# Patient Record
Sex: MALE | Race: WHITE | NOT HISPANIC OR LATINO | URBAN - METROPOLITAN AREA
[De-identification: names, ages, dates, MRNs, and addresses within clinical notes are randomized per-mention and may not be internally consistent; named-entity substitution may affect disease eponyms.]

---

## 2023-03-29 LAB
ALANINE AMINOTRANSFERASE (SGPT) (U/L) IN SER/PLAS: 15 U/L (ref 10–52)
ALBUMIN (G/DL) IN SER/PLAS: 4.4 G/DL (ref 3.4–5)
ALKALINE PHOSPHATASE (U/L) IN SER/PLAS: 44 U/L (ref 33–136)
AMORPHOUS CRYSTALS, URINE: ABNORMAL /HPF
ANION GAP IN SER/PLAS: 9 MMOL/L (ref 10–20)
APPEARANCE, URINE: ABNORMAL
ASPARTATE AMINOTRANSFERASE (SGOT) (U/L) IN SER/PLAS: 18 U/L (ref 9–39)
BASOPHILS (10*3/UL) IN BLOOD BY AUTOMATED COUNT: 0.03 X10E9/L (ref 0–0.1)
BASOPHILS/100 LEUKOCYTES IN BLOOD BY AUTOMATED COUNT: 0.4 % (ref 0–2)
BILIRUBIN TOTAL (MG/DL) IN SER/PLAS: 1 MG/DL (ref 0–1.2)
BILIRUBIN, URINE: NEGATIVE
BLOOD, URINE: ABNORMAL
CALCIUM (MG/DL) IN SER/PLAS: 9.3 MG/DL (ref 8.6–10.3)
CARBON DIOXIDE, TOTAL (MMOL/L) IN SER/PLAS: 31 MMOL/L (ref 21–32)
CHLORIDE (MMOL/L) IN SER/PLAS: 105 MMOL/L (ref 98–107)
CHOLESTEROL (MG/DL) IN SER/PLAS: 185 MG/DL (ref 0–199)
CHOLESTEROL IN HDL (MG/DL) IN SER/PLAS: 52.8 MG/DL
CHOLESTEROL/HDL RATIO: 3.5
COLOR, URINE: YELLOW
CREATININE (MG/DL) IN SER/PLAS: 0.99 MG/DL (ref 0.5–1.3)
EOSINOPHILS (10*3/UL) IN BLOOD BY AUTOMATED COUNT: 0.08 X10E9/L (ref 0–0.4)
EOSINOPHILS/100 LEUKOCYTES IN BLOOD BY AUTOMATED COUNT: 1.2 % (ref 0–6)
ERYTHROCYTE DISTRIBUTION WIDTH (RATIO) BY AUTOMATED COUNT: 13 % (ref 11.5–14.5)
ERYTHROCYTE MEAN CORPUSCULAR HEMOGLOBIN CONCENTRATION (G/DL) BY AUTOMATED: 33.1 G/DL (ref 32–36)
ERYTHROCYTE MEAN CORPUSCULAR VOLUME (FL) BY AUTOMATED COUNT: 99 FL (ref 80–100)
ERYTHROCYTES (10*6/UL) IN BLOOD BY AUTOMATED COUNT: 4.88 X10E12/L (ref 4.5–5.9)
GFR MALE: 76 ML/MIN/1.73M2
GLUCOSE (MG/DL) IN SER/PLAS: 127 MG/DL (ref 74–99)
GLUCOSE, URINE: NEGATIVE MG/DL
HEMATOCRIT (%) IN BLOOD BY AUTOMATED COUNT: 48.3 % (ref 41–52)
HEMOGLOBIN (G/DL) IN BLOOD: 16 G/DL (ref 13.5–17.5)
IMMATURE GRANULOCYTES/100 LEUKOCYTES IN BLOOD BY AUTOMATED COUNT: 0.3 % (ref 0–0.9)
KETONES, URINE: NEGATIVE MG/DL
LDL: 109 MG/DL (ref 0–99)
LEUKOCYTE ESTERASE, URINE: NEGATIVE
LEUKOCYTES (10*3/UL) IN BLOOD BY AUTOMATED COUNT: 6.7 X10E9/L (ref 4.4–11.3)
LYMPHOCYTES (10*3/UL) IN BLOOD BY AUTOMATED COUNT: 0.65 X10E9/L (ref 0.8–3)
LYMPHOCYTES/100 LEUKOCYTES IN BLOOD BY AUTOMATED COUNT: 9.7 % (ref 13–44)
MONOCYTES (10*3/UL) IN BLOOD BY AUTOMATED COUNT: 0.5 X10E9/L (ref 0.05–0.8)
MONOCYTES/100 LEUKOCYTES IN BLOOD BY AUTOMATED COUNT: 7.5 % (ref 2–10)
MUCUS, URINE: ABNORMAL /LPF
NEUTROPHILS (10*3/UL) IN BLOOD BY AUTOMATED COUNT: 5.4 X10E9/L (ref 1.6–5.5)
NEUTROPHILS/100 LEUKOCYTES IN BLOOD BY AUTOMATED COUNT: 80.9 % (ref 40–80)
NITRITE, URINE: NEGATIVE
PH, URINE: 5 (ref 5–8)
PLATELETS (10*3/UL) IN BLOOD AUTOMATED COUNT: 230 X10E9/L (ref 150–450)
POTASSIUM (MMOL/L) IN SER/PLAS: 4 MMOL/L (ref 3.5–5.3)
PROSTATE SPECIFIC AG (NG/ML) IN SER/PLAS: 0.88 NG/ML (ref 0–4)
PROTEIN TOTAL: 6.7 G/DL (ref 6.4–8.2)
PROTEIN, URINE: ABNORMAL MG/DL
RBC, URINE: ABNORMAL /HPF (ref 0–5)
SODIUM (MMOL/L) IN SER/PLAS: 141 MMOL/L (ref 136–145)
SPECIFIC GRAVITY, URINE: 1.03 (ref 1–1.03)
THYROTROPIN (MIU/L) IN SER/PLAS BY DETECTION LIMIT <= 0.05 MIU/L: 2.83 MIU/L (ref 0.44–3.98)
TRIGLYCERIDE (MG/DL) IN SER/PLAS: 118 MG/DL (ref 0–149)
UREA NITROGEN (MG/DL) IN SER/PLAS: 23 MG/DL (ref 6–23)
URIC ACID (URATE) CRYSTALS, URINE: ABNORMAL /HPF
UROBILINOGEN, URINE: <2 MG/DL (ref 0–1.9)
VLDL: 24 MG/DL (ref 0–40)
WBC, URINE: ABNORMAL /HPF (ref 0–5)

## 2023-03-30 LAB
ESTIMATED AVERAGE GLUCOSE FOR HBA1C: 140 MG/DL
HEMOGLOBIN A1C/HEMOGLOBIN TOTAL IN BLOOD: 6.5 %

## 2023-04-04 LAB
TESTOSTERONE FREE (CHAN): 39.3 PG/ML (ref 30–135)
TESTOSTERONE,TOTAL,LC-MS/MS: 367 NG/DL (ref 250–1100)

## 2023-08-15 LAB
APPEARANCE, URINE: CLEAR
BILIRUBIN, URINE: NEGATIVE
BLOOD, URINE: NEGATIVE
COLOR, URINE: YELLOW
GLUCOSE, URINE: NEGATIVE MG/DL
KETONES, URINE: ABNORMAL MG/DL
LEUKOCYTE ESTERASE, URINE: NEGATIVE
NITRITE, URINE: NEGATIVE
PH, URINE: 6 (ref 5–8)
PROTEIN, URINE: NEGATIVE MG/DL
SPECIFIC GRAVITY, URINE: 1.02 (ref 1–1.03)
UROBILINOGEN, URINE: <2 MG/DL (ref 0–1.9)

## 2023-08-16 LAB — URINE CULTURE: NO GROWTH

## 2023-10-02 DIAGNOSIS — E11.43 TYPE 2 DIABETES MELLITUS WITH DIABETIC AUTONOMIC NEUROPATHY, WITHOUT LONG-TERM CURRENT USE OF INSULIN (MULTI): Primary | ICD-10-CM

## 2023-10-03 RX ORDER — LANCETS 33 GAUGE
EACH MISCELLANEOUS
Qty: 100 EACH | Refills: 1 | Status: SHIPPED | OUTPATIENT
Start: 2023-10-03 | End: 2024-05-09

## 2023-10-25 ENCOUNTER — DOCUMENTATION (OUTPATIENT)
Dept: PHYSICAL THERAPY | Facility: CLINIC | Age: 83
End: 2023-10-25
Payer: COMMERCIAL

## 2023-10-25 DIAGNOSIS — R54 SENILE ASTHENIA: Primary | ICD-10-CM

## 2023-10-25 NOTE — PROGRESS NOTES
Physical Therapy    Discharge Summary    Name: Blaise Boss     Encounter Diagnosis   Name Primary?    Senile asthenia Yes      MRN: [id number]  : 1940  Date: 10/25/23    Discharge Summary: PT    Discharge Information: Date of discharge , Date of last visit 6-10-23, Number of attended visits 2, and Referred by DR XIAO    Rehab Discharge Reason:  HELLO DR IEMMA   WE SAW YOUR PATIENT FOR 2 VISITS---HE WAS GOING TO CONTINUE HIS HOME INSTRUCTION AND PROGRAM--PLAN DISCHARGE TO HOME EXERCISES-- Failed to schedule and/or keep follow-up appointment(s)

## 2023-12-20 ENCOUNTER — OFFICE VISIT (OUTPATIENT)
Dept: PRIMARY CARE | Facility: CLINIC | Age: 83
End: 2023-12-20
Payer: COMMERCIAL

## 2023-12-20 ENCOUNTER — DOCUMENTATION (OUTPATIENT)
Dept: PHYSICAL THERAPY | Facility: CLINIC | Age: 83
End: 2023-12-20

## 2023-12-20 ENCOUNTER — APPOINTMENT (OUTPATIENT)
Dept: PHYSICAL THERAPY | Facility: CLINIC | Age: 83
End: 2023-12-20
Payer: COMMERCIAL

## 2023-12-20 VITALS
TEMPERATURE: 96.3 F | DIASTOLIC BLOOD PRESSURE: 70 MMHG | HEART RATE: 50 BPM | WEIGHT: 175 LBS | SYSTOLIC BLOOD PRESSURE: 120 MMHG | OXYGEN SATURATION: 95 % | RESPIRATION RATE: 16 BRPM

## 2023-12-20 DIAGNOSIS — G70.00 MG (MYASTHENIA GRAVIS) (MULTI): ICD-10-CM

## 2023-12-20 DIAGNOSIS — R54 SENILE ASTHENIA: Primary | ICD-10-CM

## 2023-12-20 DIAGNOSIS — E11.9 TYPE 2 DIABETES MELLITUS WITHOUT COMPLICATION, WITHOUT LONG-TERM CURRENT USE OF INSULIN (MULTI): Primary | ICD-10-CM

## 2023-12-20 DIAGNOSIS — K22.89 ESOPHAGEAL DILATATION: ICD-10-CM

## 2023-12-20 LAB
POC FINGERSTICK BLOOD GLUCOSE: 119 MG/DL (ref 70–100)
POC HEMOGLOBIN A1C: 6 % (ref 4.2–6.5)

## 2023-12-20 PROCEDURE — 1160F RVW MEDS BY RX/DR IN RCRD: CPT | Performed by: NURSE PRACTITIONER

## 2023-12-20 PROCEDURE — 99213 OFFICE O/P EST LOW 20 MIN: CPT | Performed by: NURSE PRACTITIONER

## 2023-12-20 PROCEDURE — 83036 HEMOGLOBIN GLYCOSYLATED A1C: CPT | Performed by: NURSE PRACTITIONER

## 2023-12-20 PROCEDURE — 82962 GLUCOSE BLOOD TEST: CPT | Performed by: NURSE PRACTITIONER

## 2023-12-20 PROCEDURE — 1036F TOBACCO NON-USER: CPT | Performed by: NURSE PRACTITIONER

## 2023-12-20 PROCEDURE — 1159F MED LIST DOCD IN RCRD: CPT | Performed by: NURSE PRACTITIONER

## 2023-12-20 PROCEDURE — 3078F DIAST BP <80 MM HG: CPT | Performed by: NURSE PRACTITIONER

## 2023-12-20 PROCEDURE — 3074F SYST BP LT 130 MM HG: CPT | Performed by: NURSE PRACTITIONER

## 2023-12-20 RX ORDER — NAPROXEN SODIUM 220 MG/1
TABLET, FILM COATED ORAL
COMMUNITY

## 2023-12-20 RX ORDER — GLIMEPIRIDE 1 MG/1
1 TABLET ORAL
COMMUNITY
Start: 2021-01-07

## 2023-12-20 RX ORDER — BRIMONIDINE TARTRATE 2 MG/ML
SOLUTION/ DROPS OPHTHALMIC
COMMUNITY

## 2023-12-20 RX ORDER — LISINOPRIL AND HYDROCHLOROTHIAZIDE 10; 12.5 MG/1; MG/1
1 TABLET ORAL DAILY
COMMUNITY
Start: 2022-04-27 | End: 2024-05-06

## 2023-12-20 RX ORDER — PYRIDOSTIGMINE BROMIDE 180 MG/1
1 TABLET, EXTENDED RELEASE ORAL DAILY
COMMUNITY
Start: 2011-05-18

## 2023-12-20 RX ORDER — OMEPRAZOLE 40 MG/1
40 CAPSULE, DELAYED RELEASE ORAL
COMMUNITY
Start: 2023-12-13

## 2023-12-20 RX ORDER — BLOOD SUGAR DIAGNOSTIC
STRIP MISCELLANEOUS
COMMUNITY
Start: 2020-09-15

## 2023-12-20 RX ORDER — MYCOPHENOLATE MOFETIL 500 MG/1
TABLET ORAL
COMMUNITY
Start: 2023-10-17

## 2023-12-20 RX ORDER — RAVULIZUMAB 300 MG/3ML
SOLUTION, CONCENTRATE INTRAVENOUS
COMMUNITY

## 2023-12-20 RX ORDER — PYRIDOSTIGMINE BROMIDE 60 MG/1
TABLET ORAL
COMMUNITY

## 2023-12-20 ASSESSMENT — ENCOUNTER SYMPTOMS
RESPIRATORY NEGATIVE: 1
PSYCHIATRIC NEGATIVE: 1
CARDIOVASCULAR NEGATIVE: 1
GASTROINTESTINAL NEGATIVE: 1
ENDOCRINE NEGATIVE: 1
CONSTITUTIONAL NEGATIVE: 1

## 2023-12-20 NOTE — PROGRESS NOTES
Physical Therapy                 Therapy Communication Note    Patient Name: Blaise YOUNG Gera     Encounter Diagnosis   Name Primary?    Senile asthenia Yes      MRN: [id number]  Today's Date: 12/20/2023       PATIENT WANTED TO REVIEW HIS CURRENT HOME PROGRAM AND REVIEW  SOMETHING CALLED FATIGUE  CONDITIONING---WE DID THIS WITH PATIENT

## 2023-12-20 NOTE — PROGRESS NOTES
Blaise Boss is a 83 y.o. here for a diabetic follow up exam.     Pt states they are doing well. Following a low carbohydrate diet and is active.     Up to date with eye and foot exams, pcp visits.     Aic 6  Glimepiride 1mg PRN bs 200 has not had to use does not plan to  Over 200 take glimepiride to cover - only use if has to use pred if dr petit needs to put him on pred.  Taking mycophenolate 2 - 500mg daily - to keep immune sytem from acting   Last pred use August    Bs now 119   In PT for [id number] for his MG and 'after back surgery  He is building strength   He is utd with dr le and eye care  He struggles with the right eye and wakness, etc  Dr petit neurologist works with it   Dr [facility] are  - march 5th in [place] - dr barber retired/dr cintron and dr elvis [facility]   [facility]  Fbs -108   -  lowest 85  2hrs pp breakfast -     Vitals:    12/20/23 1357   Weight: 79.4 kg (175 lb)        Visit Vitals  /70   Pulse 50   Temp 35.7 °C (96.3 °F)   Resp 16        Lab Results   Component Value Date    POCGLU 119 (A) 12/20/2023    HGBA1C 6.0 12/20/2023    HGBA1C 6.5 (A) 03/29/2023   (  [unfilled]   Visit Vitals  /70   Pulse 50   Temp 35.7 °C (96.3 °F)   Resp 16        Current Outpatient Medications on File Prior to Visit   Medication Sig Dispense Refill    aspirin 81 mg chewable tablet Chew.      brimonidine (AlphaGAN) 0.2 % ophthalmic solution INSTILL 1 DROP INTO RIGHT EYE THREE TIMES DAILY      glimepiride (Amaryl) 1 mg tablet Take 1 tablet (1 mg) by mouth.      lancets (OneTouch Delica Plus Lancet) 33 gauge misc USE TO CHECK BLOOD GLUCOSE ONCE DAILY 100 each 1    lisinopriL-hydrochlorothiazide 10-12.5 mg tablet Take 1 tablet by mouth once daily.      mycophenolate (Cellcept) 500 mg tablet Twice daily      omeprazole (PriLOSEC) 40 mg DR capsule Take 1 capsule (40 mg) by mouth once daily.      OneTouch Ultra Test strip USE 1 STRIP TO CHECK GLUCOSE ONCE DAILY      pyridostigmine  (Mestinon) 180 mg ER tablet Take 1 tablet (180 mg) by mouth once daily.      pyridostigmine (Mestinon) 60 mg tablet TAKE 1 TABLET (60 MG) BY MOUTH IN THE MORNING, 1 TABLET (60 MG) IN THE EVENING, AND 1 TABLET (60 MG) BEFORE BEDTIME      ravulizumab (Ultomiris) 100 mg/mL injection Infuse into a venous catheter.       No current facility-administered medications on file prior to visit.      Review of Systems   Constitutional: Negative.    Respiratory: Negative.     Cardiovascular: Negative.    Gastrointestinal: Negative.    Endocrine: Negative.    Skin: Negative.    Psychiatric/Behavioral: Negative.            Physical Exam  Vitals and nursing note reviewed.   Constitutional:       Appearance: Normal appearance.   HENT:      Head: Normocephalic.   Eyes:      Pupils: Pupils are equal, round, and reactive to light.   Cardiovascular:      Rate and Rhythm: Normal rate and regular rhythm.      Heart sounds: Normal heart sounds.   Pulmonary:      Effort: Pulmonary effort is normal.      Breath sounds: Normal breath sounds.   Skin:     General: Skin is warm and dry.   Neurological:      General: No focal deficit present.      Mental Status: He is alert and oriented to person, place, and time. Mental status is at baseline.   Psychiatric:         Mood and Affect: Mood normal.         Behavior: Behavior normal.         Thought Content: Thought content normal.         Judgment: Judgment normal.        Problem List Items Addressed This Visit    None  Visit Diagnoses         Codes    Type 2 diabetes mellitus without complication, without long-term current use of insulin (CMS/[facility])    -  Primary E11.9    Relevant Orders    POCT Fingerstick Glucose manually resulted (Completed)    POCT glycosylated hemoglobin (Hb A1C) manually resulted (Completed)    MG (myasthenia gravis) (CMS/[facility])     G70.00    Esophageal dilatation     K22.89

## 2024-03-20 ENCOUNTER — APPOINTMENT (OUTPATIENT)
Dept: PRIMARY CARE | Facility: CLINIC | Age: 84
End: 2024-03-20
Payer: COMMERCIAL

## 2024-03-26 ENCOUNTER — TELEPHONE (OUTPATIENT)
Dept: PRIMARY CARE | Facility: CLINIC | Age: 84
End: 2024-03-26
Payer: COMMERCIAL

## 2024-03-26 NOTE — TELEPHONE ENCOUNTER
SEAMUS from pt. Adm [facility] 3/17 for Covid and Myasthenia Gravis. Poss discharge tomorrow. Called to express his thanks to you and Laura Seaver for exceptional care and will call to follow up.

## 2024-03-27 ENCOUNTER — APPOINTMENT (OUTPATIENT)
Dept: PRIMARY CARE | Facility: CLINIC | Age: 84
End: 2024-03-27
Payer: COMMERCIAL

## 2024-04-02 ENCOUNTER — APPOINTMENT (OUTPATIENT)
Dept: PHYSICAL THERAPY | Facility: CLINIC | Age: 84
End: 2024-04-02
Payer: COMMERCIAL

## 2024-04-02 ENCOUNTER — TELEPHONE (OUTPATIENT)
Dept: PRIMARY CARE | Facility: CLINIC | Age: 84
End: 2024-04-02

## 2024-04-02 ENCOUNTER — APPOINTMENT (OUTPATIENT)
Dept: PRIMARY CARE | Facility: CLINIC | Age: 84
End: 2024-04-02
Payer: COMMERCIAL

## 2024-04-02 NOTE — TELEPHONE ENCOUNTER
BIRDIE SOLANO, PT FROM [facility] - GAVE VERBAL ORDER FOR HOME HEALTH PHYSICAL THEAPY 2 X WEEK X 4 WEEKS.

## 2024-04-10 ENCOUNTER — OFFICE VISIT (OUTPATIENT)
Dept: PRIMARY CARE | Facility: CLINIC | Age: 84
End: 2024-04-10
Payer: COMMERCIAL

## 2024-04-10 ENCOUNTER — TELEPHONE (OUTPATIENT)
Dept: PRIMARY CARE | Facility: CLINIC | Age: 84
End: 2024-04-10

## 2024-04-10 VITALS
WEIGHT: 171 LBS | BODY MASS INDEX: 24.48 KG/M2 | HEART RATE: 62 BPM | DIASTOLIC BLOOD PRESSURE: 60 MMHG | OXYGEN SATURATION: 99 % | SYSTOLIC BLOOD PRESSURE: 112 MMHG | TEMPERATURE: 96.7 F | RESPIRATION RATE: 16 BRPM | HEIGHT: 70 IN

## 2024-04-10 DIAGNOSIS — E11.9 TYPE 2 DIABETES MELLITUS WITHOUT COMPLICATION, WITHOUT LONG-TERM CURRENT USE OF INSULIN (MULTI): ICD-10-CM

## 2024-04-10 DIAGNOSIS — R60.9 EDEMA, UNSPECIFIED TYPE: ICD-10-CM

## 2024-04-10 DIAGNOSIS — G70.00 MG (MYASTHENIA GRAVIS) (MULTI): Primary | ICD-10-CM

## 2024-04-10 LAB
POC FINGERSTICK BLOOD GLUCOSE: 104 MG/DL (ref 70–100)
POC HEMOGLOBIN A1C: 5.5 % (ref 4.2–6.5)

## 2024-04-10 PROCEDURE — 3078F DIAST BP <80 MM HG: CPT | Performed by: INTERNAL MEDICINE

## 2024-04-10 PROCEDURE — 82962 GLUCOSE BLOOD TEST: CPT | Performed by: INTERNAL MEDICINE

## 2024-04-10 PROCEDURE — 3074F SYST BP LT 130 MM HG: CPT | Performed by: INTERNAL MEDICINE

## 2024-04-10 PROCEDURE — 1159F MED LIST DOCD IN RCRD: CPT | Performed by: INTERNAL MEDICINE

## 2024-04-10 PROCEDURE — 83036 HEMOGLOBIN GLYCOSYLATED A1C: CPT | Performed by: INTERNAL MEDICINE

## 2024-04-10 PROCEDURE — 99213 OFFICE O/P EST LOW 20 MIN: CPT | Performed by: INTERNAL MEDICINE

## 2024-04-10 NOTE — PROGRESS NOTES
"Subjective   Patient ID: Blaise Boss is a 83 y.o. male who presents for Hospital Follow-up ([facility] -Myasthenia gravis in crisis (HCC) /Myasthenia exacerbation (HCC) /SOB/difficulty swollowing).    HPI covid   10 days of hospital   Feeling well he is non steroids and much better   Feels good sugars are ok we did review   Some edema     Review of Systems    Objective   /60 (BP Location: Left arm, Patient Position: Sitting, BP Cuff Size: Adult)   Pulse 62   Temp 35.9 °C (96.7 °F) (Temporal)   Resp 16   Ht 1.778 m (5' 10\")   Wt 77.6 kg (171 lb)   SpO2 99%   BMI 24.54 kg/m²     Physical Exam  NAD  CARD RRR  PULM CTA  ABD NEG   EXT  NL    Assessment/Plan   Diagnoses and all orders for this visit:  MG (myasthenia gravis) (CMS/[facility])  Comments:  recent flare now good  Edema, unspecified type  Comments:  back on lisin hctz  Type 2 diabetes mellitus without complication, without long-term current use of insulin (CMS/[facility])  Comments:  daya aic now         "

## 2024-05-06 DIAGNOSIS — I10 ESSENTIAL HYPERTENSION: Primary | ICD-10-CM

## 2024-05-06 RX ORDER — LISINOPRIL AND HYDROCHLOROTHIAZIDE 10; 12.5 MG/1; MG/1
1 TABLET ORAL DAILY
Qty: 90 TABLET | Refills: 0 | Status: SHIPPED | OUTPATIENT
Start: 2024-05-06

## 2024-05-08 DIAGNOSIS — E11.43 TYPE 2 DIABETES MELLITUS WITH DIABETIC AUTONOMIC NEUROPATHY, WITHOUT LONG-TERM CURRENT USE OF INSULIN (MULTI): ICD-10-CM

## 2024-05-09 RX ORDER — LANCETS 33 GAUGE
EACH MISCELLANEOUS
Qty: 100 EACH | Refills: 0 | Status: SHIPPED | OUTPATIENT
Start: 2024-05-09

## 2024-06-17 ENCOUNTER — TELEPHONE (OUTPATIENT)
Dept: PRIMARY CARE | Facility: CLINIC | Age: 84
End: 2024-06-17
Payer: COMMERCIAL

## 2024-06-26 ENCOUNTER — OFFICE VISIT (OUTPATIENT)
Dept: PRIMARY CARE | Facility: CLINIC | Age: 84
End: 2024-06-26
Payer: COMMERCIAL

## 2024-06-26 VITALS
DIASTOLIC BLOOD PRESSURE: 74 MMHG | TEMPERATURE: 97.2 F | BODY MASS INDEX: 26.2 KG/M2 | HEART RATE: 55 BPM | WEIGHT: 183 LBS | SYSTOLIC BLOOD PRESSURE: 120 MMHG | OXYGEN SATURATION: 98 % | HEIGHT: 70 IN

## 2024-06-26 DIAGNOSIS — R60.1 GENERALIZED EDEMA: Primary | ICD-10-CM

## 2024-06-26 PROCEDURE — 99213 OFFICE O/P EST LOW 20 MIN: CPT | Performed by: INTERNAL MEDICINE

## 2024-06-26 PROCEDURE — 3074F SYST BP LT 130 MM HG: CPT | Performed by: INTERNAL MEDICINE

## 2024-06-26 PROCEDURE — 3078F DIAST BP <80 MM HG: CPT | Performed by: INTERNAL MEDICINE

## 2024-06-26 PROCEDURE — 1159F MED LIST DOCD IN RCRD: CPT | Performed by: INTERNAL MEDICINE

## 2024-06-26 RX ORDER — FLUTICASONE PROPIONATE 50 MCG
1 SPRAY, SUSPENSION (ML) NASAL DAILY
COMMUNITY
Start: 2023-08-15

## 2024-06-26 RX ORDER — FUROSEMIDE 20 MG/1
20 TABLET ORAL DAILY
Qty: 30 TABLET | Refills: 11 | Status: SHIPPED | OUTPATIENT
Start: 2024-06-26 | End: 2025-06-26

## 2024-06-26 RX ORDER — VITAMIN E MIXED 400 UNIT
200 CAPSULE ORAL
COMMUNITY

## 2024-06-26 RX ORDER — POTASSIUM CHLORIDE 750 MG/1
10 TABLET, FILM COATED, EXTENDED RELEASE ORAL DAILY
Qty: 30 TABLET | Refills: 11 | Status: SHIPPED | OUTPATIENT
Start: 2024-06-26 | End: 2025-06-26

## 2024-06-26 RX ORDER — ZINC GLUCONATE 50 MG
50 TABLET ORAL
COMMUNITY

## 2024-06-26 RX ORDER — MELATONIN 5 MG
10 CAPSULE ORAL DAILY PRN
COMMUNITY

## 2024-06-26 RX ORDER — CHOLECALCIFEROL (VITAMIN D3) 50 MCG
2000 TABLET ORAL
COMMUNITY

## 2024-06-26 NOTE — PROGRESS NOTES
"Subjective   Patient ID: Blaise Boss is a 83 y.o. male who presents for Foot Swelling (HAS BEEN GOING ON SINCE MARCH).    HPI HE FEELS MUCH BETTER   HE IS DOING WELL       Review of Systems    Objective   /74 (BP Location: Left arm, Patient Position: Sitting, BP Cuff Size: Adult)   Pulse 55   Temp 36.2 °C (97.2 °F)   Ht 1.778 m (5' 10\")   Wt 83 kg (183 lb)   SpO2 98%   BMI 26.26 kg/m²     Physical Exam  NAD  CARD RRR  PULM CTA  ABD NEG   EXT  EDEMA BI PEDAL    Assessment/Plan   Diagnoses and all orders for this visit:  Generalized edema  Comments:  WE DID DISCUSS TAKING THE LASIX AND K  Orders:  -     furosemide (Lasix) 20 mg tablet; Take 1 tablet (20 mg) by mouth once daily.  -     potassium chloride CR (Klor-Con) 10 mEq ER tablet; Take 1 tablet (10 mEq) by mouth once daily. Do not crush, chew, or split.  TAKE WITH LASIX         "

## 2024-07-15 ENCOUNTER — LAB (OUTPATIENT)
Dept: LAB | Facility: LAB | Age: 84
End: 2024-07-15
Payer: COMMERCIAL

## 2024-07-15 ENCOUNTER — APPOINTMENT (OUTPATIENT)
Dept: PRIMARY CARE | Facility: CLINIC | Age: 84
End: 2024-07-15
Payer: COMMERCIAL

## 2024-07-15 VITALS
SYSTOLIC BLOOD PRESSURE: 124 MMHG | DIASTOLIC BLOOD PRESSURE: 82 MMHG | HEIGHT: 70 IN | OXYGEN SATURATION: 97 % | BODY MASS INDEX: 25.48 KG/M2 | TEMPERATURE: 97.5 F | WEIGHT: 178 LBS | HEART RATE: 50 BPM

## 2024-07-15 DIAGNOSIS — E11.9 TYPE 2 DIABETES MELLITUS WITHOUT COMPLICATION, WITHOUT LONG-TERM CURRENT USE OF INSULIN (MULTI): ICD-10-CM

## 2024-07-15 DIAGNOSIS — I10 ESSENTIAL HYPERTENSION: ICD-10-CM

## 2024-07-15 DIAGNOSIS — G70.00 MG (MYASTHENIA GRAVIS) (MULTI): ICD-10-CM

## 2024-07-15 DIAGNOSIS — N20.0 RENAL STONES: ICD-10-CM

## 2024-07-15 DIAGNOSIS — K22.89 ESOPHAGEAL DILATATION: ICD-10-CM

## 2024-07-15 DIAGNOSIS — E78.2 MIXED HYPERLIPIDEMIA: ICD-10-CM

## 2024-07-15 DIAGNOSIS — Z00.00 WELLNESS EXAMINATION: ICD-10-CM

## 2024-07-15 DIAGNOSIS — Z00.00 MEDICARE ANNUAL WELLNESS VISIT, SUBSEQUENT: Primary | ICD-10-CM

## 2024-07-15 DIAGNOSIS — K57.90 DIVERTICULOSIS: ICD-10-CM

## 2024-07-15 DIAGNOSIS — E53.8 VITAMIN B 12 DEFICIENCY: ICD-10-CM

## 2024-07-15 DIAGNOSIS — N40.1 BENIGN PROSTATIC HYPERPLASIA WITH LOWER URINARY TRACT SYMPTOMS, SYMPTOM DETAILS UNSPECIFIED: ICD-10-CM

## 2024-07-15 DIAGNOSIS — M54.12 CERVICAL RADICULOPATHY: ICD-10-CM

## 2024-07-15 DIAGNOSIS — G70.00 MYASTHENIA GRAVIS (MULTI): ICD-10-CM

## 2024-07-15 DIAGNOSIS — Z12.5 PROSTATE CANCER SCREENING: ICD-10-CM

## 2024-07-15 DIAGNOSIS — R60.9 EDEMA, UNSPECIFIED TYPE: ICD-10-CM

## 2024-07-15 PROBLEM — F03.90 DEMENTIA, UNSPECIFIED DEMENTIA SEVERITY, UNSPECIFIED DEMENTIA TYPE, UNSPECIFIED WHETHER BEHAVIORAL, PSYCHOTIC, OR MOOD DISTURBANCE OR ANXIETY (MULTI): Status: ACTIVE | Noted: 2024-07-15

## 2024-07-15 LAB
ALBUMIN SERPL BCP-MCNC: 4.2 G/DL (ref 3.4–5)
ALP SERPL-CCNC: 47 U/L (ref 33–136)
ALT SERPL W P-5'-P-CCNC: 8 U/L (ref 10–52)
ANION GAP SERPL CALC-SCNC: 10 MMOL/L (ref 10–20)
AST SERPL W P-5'-P-CCNC: 13 U/L (ref 9–39)
BILIRUB SERPL-MCNC: 1.3 MG/DL (ref 0–1.2)
BUN SERPL-MCNC: 18 MG/DL (ref 6–23)
CALCIUM SERPL-MCNC: 9.3 MG/DL (ref 8.6–10.3)
CHLORIDE SERPL-SCNC: 104 MMOL/L (ref 98–107)
CHOLEST SERPL-MCNC: 172 MG/DL (ref 0–199)
CHOLESTEROL/HDL RATIO: 3.6
CO2 SERPL-SCNC: 29 MMOL/L (ref 21–32)
CREAT SERPL-MCNC: 0.92 MG/DL (ref 0.5–1.3)
EGFRCR SERPLBLD CKD-EPI 2021: 83 ML/MIN/1.73M*2
ERYTHROCYTE [DISTWIDTH] IN BLOOD BY AUTOMATED COUNT: 12.1 % (ref 11.5–14.5)
GLUCOSE SERPL-MCNC: 99 MG/DL (ref 74–99)
HCT VFR BLD AUTO: 46.7 % (ref 41–52)
HDLC SERPL-MCNC: 47.8 MG/DL
HGB BLD-MCNC: 15.4 G/DL (ref 13.5–17.5)
LDLC SERPL CALC-MCNC: 106 MG/DL
MCH RBC QN AUTO: 31.6 PG (ref 26–34)
MCHC RBC AUTO-ENTMCNC: 33 G/DL (ref 32–36)
MCV RBC AUTO: 96 FL (ref 80–100)
NON HDL CHOLESTEROL: 124 MG/DL (ref 0–149)
NRBC BLD-RTO: 0 /100 WBCS (ref 0–0)
PLATELET # BLD AUTO: 244 X10*3/UL (ref 150–450)
POTASSIUM SERPL-SCNC: 4.6 MMOL/L (ref 3.5–5.3)
PROT SERPL-MCNC: 6.6 G/DL (ref 6.4–8.2)
PSA SERPL-MCNC: 1.45 NG/ML
RBC # BLD AUTO: 4.87 X10*6/UL (ref 4.5–5.9)
SODIUM SERPL-SCNC: 138 MMOL/L (ref 136–145)
TRIGL SERPL-MCNC: 92 MG/DL (ref 0–149)
VLDL: 18 MG/DL (ref 0–40)
WBC # BLD AUTO: 4.6 X10*3/UL (ref 4.4–11.3)

## 2024-07-15 PROCEDURE — 36415 COLL VENOUS BLD VENIPUNCTURE: CPT

## 2024-07-15 PROCEDURE — G0103 PSA SCREENING: HCPCS

## 2024-07-15 PROCEDURE — 1036F TOBACCO NON-USER: CPT | Performed by: INTERNAL MEDICINE

## 2024-07-15 PROCEDURE — 3074F SYST BP LT 130 MM HG: CPT | Performed by: INTERNAL MEDICINE

## 2024-07-15 PROCEDURE — 99213 OFFICE O/P EST LOW 20 MIN: CPT | Performed by: INTERNAL MEDICINE

## 2024-07-15 PROCEDURE — 1170F FXNL STATUS ASSESSED: CPT | Performed by: INTERNAL MEDICINE

## 2024-07-15 PROCEDURE — G0439 PPPS, SUBSEQ VISIT: HCPCS | Performed by: INTERNAL MEDICINE

## 2024-07-15 PROCEDURE — 1159F MED LIST DOCD IN RCRD: CPT | Performed by: INTERNAL MEDICINE

## 2024-07-15 PROCEDURE — 99397 PER PM REEVAL EST PAT 65+ YR: CPT | Performed by: INTERNAL MEDICINE

## 2024-07-15 PROCEDURE — 80053 COMPREHEN METABOLIC PANEL: CPT

## 2024-07-15 PROCEDURE — 85027 COMPLETE CBC AUTOMATED: CPT

## 2024-07-15 PROCEDURE — 3079F DIAST BP 80-89 MM HG: CPT | Performed by: INTERNAL MEDICINE

## 2024-07-15 PROCEDURE — 80061 LIPID PANEL: CPT

## 2024-07-15 ASSESSMENT — ENCOUNTER SYMPTOMS
PALPITATIONS: 0
ENDOCRINE NEGATIVE: 1
NUMBNESS: 0
WHEEZING: 0
FATIGUE: 0
LIGHT-HEADEDNESS: 0
EYE REDNESS: 0
DIZZINESS: 0
CHILLS: 0
EYE ITCHING: 0
EYE PAIN: 0
ALLERGIC/IMMUNOLOGIC NEGATIVE: 1
SHORTNESS OF BREATH: 0
CHOKING: 0
COUGH: 0
UNEXPECTED WEIGHT CHANGE: 0
DIAPHORESIS: 0
HEADACHES: 0
FEVER: 0
PSYCHIATRIC NEGATIVE: 1
EYE DISCHARGE: 0
GASTROINTESTINAL NEGATIVE: 1
ACTIVITY CHANGE: 0
CHEST TIGHTNESS: 0
ARTHRALGIAS: 0
STRIDOR: 0
APPETITE CHANGE: 0
PHOTOPHOBIA: 0
APNEA: 0
FACIAL ASYMMETRY: 0
HEMATOLOGIC/LYMPHATIC NEGATIVE: 1

## 2024-07-15 ASSESSMENT — ACTIVITIES OF DAILY LIVING (ADL)
GROCERY_SHOPPING: INDEPENDENT
BATHING: INDEPENDENT
DOING_HOUSEWORK: INDEPENDENT
DRESSING: INDEPENDENT
TAKING_MEDICATION: INDEPENDENT
MANAGING_FINANCES: INDEPENDENT

## 2024-07-15 ASSESSMENT — PATIENT HEALTH QUESTIONNAIRE - PHQ9
SUM OF ALL RESPONSES TO PHQ9 QUESTIONS 1 AND 2: 0
2. FEELING DOWN, DEPRESSED OR HOPELESS: NOT AT ALL
1. LITTLE INTEREST OR PLEASURE IN DOING THINGS: NOT AT ALL

## 2024-07-15 NOTE — PROGRESS NOTES
Subjective   Reason for Visit: Blaise Boss is an 83 y.o. male here for a Medicare Wellness visit.     Past Medical, Surgical, and Family History reviewed and updated in chart.    Reviewed all medications by prescribing practitioner or clinical pharmacist (such as prescriptions, OTCs, herbal therapies and supplements) and documented in the medical record.    HPIEDEMA IS BETTER   WATCHES SALT INTAKE  BACK PAIN     PMHX, PSHX, ALL, SOCHX, PRE MED ALL REVIEWED     MMSE 30 30     PHQ 2 NL     NO FALLS NOTED         Past Medical History:   Diagnosis Date   Bradycardia, unspecified   Chest pain   [facility]   Diabetes mellitus with hyperglycemia (CMS/[facility])   Diverticulitis   Dizziness   [facility]   Drooping eyelid, left   [facility]   High blood pressure (CMS/[facility])   Hydrocele   [facility]   Kidney stones   Lumbar stenosis with neurogenic claudication   Myasthenia gravis with exacerbation (CMS/[facility])   Raynaud's disease     Past Surgical History:   Procedure Laterality Date   COLONOSCOPY   CYSTOSCOPY   CYSTOSCOPY   HYDROCELE ASPIRATION   LAMINECTOMY   Spinal Stenosis Surgery   MICRODISCECTOMY LUMBAR 8/1/2022   MR ANGIOGRAM HEAD WO IV CONTRAST 2/22/2019   MR ANGIOGRAM HEAD WO IV CONTRAST 2/22/2019   OTHER SURGICAL HISTORY   removal of stent   OTHER SURGICAL HISTORY   Left external levator resection   OTHER SURGICAL HISTORY   Microdecompression L2-4     Family History   Problem Relation Name Age of Onset   Diabetes Mother Ban Boss   Stroke Mother Ban Boss   Alcohol abuse Father   No Known Problems Sister   No Known Problems Brother   brother 2 still living     Social History     Tobacco Use   Smoking status: Never   Smokeless tobacco: Never   Substance Use Topics   Alcohol use: Never   Comment: Caffeine intake: coffee(every once in a while)     Current Outpatient Medications   Medication Sig   glimepiride (AMARYL) 1 mg tablet Take 1 tablet by mouth as needed. For blood glucose over 200, maximum dose once per day   lisinopril (ZESTRIL)  "20 mg tablet Take 1 tablet by mouth once daily.   pyridostigmine (MESTINON) 60 mg tablet Take 60 mg by mouth three times a day.   cholecalciferol (VITAMIN D-3) 50 mcg (2,000 unit) tablet Take 2,000 Units by mouth once daily.   mupirocin (BACTROBAN) 2 % ointment Apply 1 application to affected area.   ravulizumab-cwvz (ULTOMIRIS) 100 mg/mL injection Inject intravenously. Every 8 weeks   mycophenolate Mofetil (CELLCEPT) 500 mg tablet Take 500 mg by mouth two times a day. Twice daily   cyanocobalamin, vitamin B-12, (VITAMIN B-12 ORAL) Take 1 tablet by mouth once daily.   Melatonin 5 mg cap Take 5 mg by mouth at bedtime as needed.     No current facility-administered medications for this visit.     ALLERGIES   Allergen Reactions   Aminoglycosides Other: See Comments   Gentamycin, Kanamycin, Amikacin, Neomycin, Streptomycin, Tobramycin, Netilmycin, Paromomycin, Paromomycin, Spectinomycin, Vancomycin      Patient Care Team:  Brooks Haq MD as PCP - General     Review of Systems   Constitutional:  Negative for activity change, appetite change, chills, diaphoresis, fatigue, fever and unexpected weight change.   HENT: Negative.     Eyes:  Negative for photophobia, pain, discharge, redness, itching and visual disturbance.   Respiratory:  Negative for apnea, cough, choking, chest tightness, shortness of breath, wheezing and stridor.    Cardiovascular:  Negative for chest pain, palpitations and leg swelling.   Gastrointestinal: Negative.    Endocrine: Negative.    Genitourinary: Negative.    Musculoskeletal:  Negative for arthralgias.   Skin: Negative.    Allergic/Immunologic: Negative.    Neurological:  Negative for dizziness, facial asymmetry, light-headedness, numbness and headaches.   Hematological: Negative.    Psychiatric/Behavioral: Negative.         Objective   Vitals:  /82 (BP Location: Left arm, Patient Position: Sitting, BP Cuff Size: Adult)   Pulse 50   Temp 36.4 °C (97.5 °F)   Ht 1.778 m (5' 10\")   Wt " 80.7 kg (178 lb)   SpO2 97%   BMI 25.54 kg/m²       Physical Exam  Constitutional:       Appearance: Normal appearance.   HENT:      Head: Normocephalic and atraumatic.      Right Ear: Tympanic membrane normal.      Left Ear: Tympanic membrane normal.      Nose: Nose normal.   Eyes:      Extraocular Movements: Extraocular movements intact.      Conjunctiva/sclera: Conjunctivae normal.      Pupils: Pupils are equal, round, and reactive to light.   Cardiovascular:      Rate and Rhythm: Normal rate and regular rhythm.      Pulses: Normal pulses.      Heart sounds: Normal heart sounds.   Pulmonary:      Effort: Pulmonary effort is normal.      Breath sounds: Normal breath sounds.   Abdominal:      General: Abdomen is flat. Bowel sounds are normal.      Palpations: Abdomen is soft.   Musculoskeletal:         General: Normal range of motion.      Cervical back: Normal range of motion and neck supple.   Skin:     General: Skin is warm and dry.   Neurological:      General: No focal deficit present.      Mental Status: He is oriented to person, place, and time. Mental status is at baseline.   Psychiatric:         Mood and Affect: Mood normal.         Thought Content: Thought content normal.         Judgment: Judgment normal.       Assessment/Plan   Problem List Items Addressed This Visit       Hyperlipidemia    Overview     GOOD         Essential hypertension    Overview     GOOD OFF THE MED   NOW LOW SODIUM         Myasthenia gravis (Multi)    Overview     SEVERE DISEASE OVERALL OK  DOING WELL         Relevant Orders    CBC    Edema    Overview     MINIMAL         Diverticulosis    Overview     GOOD         Type 2 diabetes mellitus without complication, without long-term current use of insulin (Multi)    Overview     GREAT CONTROL  NO TOD         Esophageal dilatation    Overview     GOOD          Prostate cancer screening - Primary    Relevant Orders    Prostate Specific Antigen, Screen    Vitamin B 12 deficiency     Overview     GOOD          Benign prostatic hyperplasia with lower urinary tract symptoms    Overview     LETS DO PSA         Renal stones    Overview     NO ISSUES         Cervical radiculopathy    Overview     FAIR          Relevant Orders    Referral to Pain Medicine

## 2024-07-16 ENCOUNTER — APPOINTMENT (OUTPATIENT)
Dept: PRIMARY CARE | Facility: CLINIC | Age: 84
End: 2024-07-16
Payer: COMMERCIAL

## 2024-07-16 VITALS
DIASTOLIC BLOOD PRESSURE: 70 MMHG | HEIGHT: 70 IN | RESPIRATION RATE: 16 BRPM | BODY MASS INDEX: 25.34 KG/M2 | WEIGHT: 177 LBS | HEART RATE: 48 BPM | SYSTOLIC BLOOD PRESSURE: 120 MMHG

## 2024-07-16 DIAGNOSIS — K22.89 ESOPHAGEAL DILATATION: Primary | ICD-10-CM

## 2024-07-16 DIAGNOSIS — I10 ESSENTIAL HYPERTENSION: ICD-10-CM

## 2024-07-16 DIAGNOSIS — G70.00 MYASTHENIA GRAVIS (MULTI): ICD-10-CM

## 2024-07-16 DIAGNOSIS — U07.1 COVID: ICD-10-CM

## 2024-07-16 DIAGNOSIS — E11.9 TYPE 2 DIABETES MELLITUS WITHOUT COMPLICATION, WITHOUT LONG-TERM CURRENT USE OF INSULIN (MULTI): ICD-10-CM

## 2024-07-16 LAB
POC FINGERSTICK BLOOD GLUCOSE: 98 MG/DL (ref 70–100)
POC HEMOGLOBIN A1C: 6.1 % (ref 4.2–6.5)

## 2024-07-16 PROCEDURE — 1159F MED LIST DOCD IN RCRD: CPT | Performed by: NURSE PRACTITIONER

## 2024-07-16 PROCEDURE — 82962 GLUCOSE BLOOD TEST: CPT | Performed by: NURSE PRACTITIONER

## 2024-07-16 PROCEDURE — 3074F SYST BP LT 130 MM HG: CPT | Performed by: NURSE PRACTITIONER

## 2024-07-16 PROCEDURE — 3078F DIAST BP <80 MM HG: CPT | Performed by: NURSE PRACTITIONER

## 2024-07-16 PROCEDURE — 83036 HEMOGLOBIN GLYCOSYLATED A1C: CPT | Performed by: NURSE PRACTITIONER

## 2024-07-16 PROCEDURE — 1160F RVW MEDS BY RX/DR IN RCRD: CPT | Performed by: NURSE PRACTITIONER

## 2024-07-16 PROCEDURE — 99213 OFFICE O/P EST LOW 20 MIN: CPT | Performed by: NURSE PRACTITIONER

## 2024-07-16 PROCEDURE — 1036F TOBACCO NON-USER: CPT | Performed by: NURSE PRACTITIONER

## 2024-07-16 ASSESSMENT — ENCOUNTER SYMPTOMS
ENDOCRINE NEGATIVE: 1
CARDIOVASCULAR NEGATIVE: 1
MUSCULOSKELETAL NEGATIVE: 1
GASTROINTESTINAL NEGATIVE: 1
RESPIRATORY NEGATIVE: 1
CONSTITUTIONAL NEGATIVE: 1
NEUROLOGICAL NEGATIVE: 1
PSYCHIATRIC NEGATIVE: 1

## 2024-07-16 NOTE — PROGRESS NOTES
" Blaise Boss is a 83 y.o. here for a diabetic follow up exam.     Pt states they are doing well. Following a low carbohydrate diet and is active.     Up to date with eye and foot exams, pcp visits.     Aic 6.1 (5.5, 5.9, 6.0, 6.5)  Lost some weight while inpt     Was in the hospital in march  Then had an inpt for covid for 10 days and MG crisis and was on prednisone and   Injects of insulin   Then as outpt did not pass the swallow test and had ng, etc.   He saw dr le yesterday -now scribed lasix and K   Saw neuro and was told maybe just due to the steroids - last may 8th    His eating is back to normal now and he did have PT, SP and nursing. But now released.   He has been swallowing wekll now and eating more   And he has his appetite back and it was great for him to eat it felt good overall     B/p reading ok as well - he has lisinopril   Dr le stopped the lisinopril    Told to take pills only if bp is >140 for 3-4 days  He plans on doing tht     Will check him in 3 weeks re: above   Dr le said to stay with the lisino/hctz so rto  Check b/p and ? On 1/2 tablet       Lab Results   Component Value Date    POCGLU 98 07/16/2024    POCGLU 104 (A) 04/10/2024    POCGLU 119 (A) 12/20/2023    HGBA1C 6.1 07/16/2024    HGBA1C 5.5 04/10/2024    HGBA1C 5.9 (H) 03/17/2024    HGBA1C 6.0 12/20/2023    HGBA1C 6.5 (A) 03/29/2023     /70   Pulse (!) 48   Resp 16   Ht 1.788 m (5' 10.39\")   Wt 80.3 kg (177 lb)   BMI 25.11 kg/m²    Wt Readings from Last 5 Encounters:   07/16/24 80.3 kg (177 lb)   07/15/24 80.7 kg (178 lb)   06/26/24 83 kg (183 lb)   04/10/24 77.6 kg (171 lb)   12/20/23 79.4 kg (175 lb)          Review of Systems   Constitutional: Negative.    HENT: Negative.     Respiratory: Negative.     Cardiovascular: Negative.    Gastrointestinal: Negative.    Endocrine: Negative.    Genitourinary: Negative.    Musculoskeletal: Negative.    Skin: Negative.    Neurological: Negative.  "   Psychiatric/Behavioral: Negative.          Physical Exam  Vitals and nursing note reviewed.   Constitutional:       Appearance: Normal appearance. He is normal weight.   HENT:      Head: Normocephalic.   Eyes:      Pupils: Pupils are equal, round, and reactive to light.   Cardiovascular:      Rate and Rhythm: Normal rate and regular rhythm.      Heart sounds: Normal heart sounds.   Pulmonary:      Effort: Pulmonary effort is normal.      Breath sounds: Normal breath sounds.   Skin:     General: Skin is warm and dry.   Neurological:      General: No focal deficit present.      Mental Status: He is alert and oriented to person, place, and time. Mental status is at baseline.   Psychiatric:         Mood and Affect: Mood normal.         Behavior: Behavior normal.         Thought Content: Thought content normal.         Judgment: Judgment normal.        Problem List Items Addressed This Visit             ICD-10-CM    Essential hypertension I10    Myasthenia gravis (Multi) G70.00    Type 2 diabetes mellitus without complication, without long-term current use of insulin (Multi) E11.9    Relevant Orders    POCT Fingerstick Glucose manually resulted (Completed)    POCT glycosylated hemoglobin (Hb A1C) manually resulted (Completed)    Esophageal dilatation - Primary K22.89     Other Visit Diagnoses         Codes    COVID     U07.1             Laura L Seaver, APRN-CNP

## 2024-08-06 ENCOUNTER — APPOINTMENT (OUTPATIENT)
Dept: PRIMARY CARE | Facility: CLINIC | Age: 84
End: 2024-08-06
Payer: COMMERCIAL

## 2024-08-06 VITALS
HEART RATE: 70 BPM | HEIGHT: 71 IN | DIASTOLIC BLOOD PRESSURE: 82 MMHG | WEIGHT: 177 LBS | SYSTOLIC BLOOD PRESSURE: 126 MMHG | BODY MASS INDEX: 24.78 KG/M2

## 2024-08-06 DIAGNOSIS — E11.43 TYPE 2 DIABETES MELLITUS WITH DIABETIC AUTONOMIC NEUROPATHY, WITHOUT LONG-TERM CURRENT USE OF INSULIN (MULTI): ICD-10-CM

## 2024-08-06 DIAGNOSIS — I79.8 OTHER DISORDERS OF ARTERIES, ARTERIOLES AND CAPILLARIES IN DISEASES CLASSIFIED ELSEWHERE (CMS-HCC): ICD-10-CM

## 2024-08-06 DIAGNOSIS — L53.9 DEPENDENT RUBOR: ICD-10-CM

## 2024-08-06 DIAGNOSIS — E11.65 TYPE 2 DIABETES MELLITUS WITH HYPERGLYCEMIA, WITHOUT LONG-TERM CURRENT USE OF INSULIN (MULTI): Primary | ICD-10-CM

## 2024-08-06 DIAGNOSIS — R60.0 LOCALIZED EDEMA: ICD-10-CM

## 2024-08-06 LAB
POC FINGERSTICK BLOOD GLUCOSE: 122 MG/DL (ref 70–100)
POC HEMOGLOBIN A1C: 6 % (ref 4.2–6.5)

## 2024-08-06 PROCEDURE — 83036 HEMOGLOBIN GLYCOSYLATED A1C: CPT | Performed by: NURSE PRACTITIONER

## 2024-08-06 PROCEDURE — 3074F SYST BP LT 130 MM HG: CPT | Performed by: NURSE PRACTITIONER

## 2024-08-06 PROCEDURE — 1159F MED LIST DOCD IN RCRD: CPT | Performed by: NURSE PRACTITIONER

## 2024-08-06 PROCEDURE — 99214 OFFICE O/P EST MOD 30 MIN: CPT | Performed by: NURSE PRACTITIONER

## 2024-08-06 PROCEDURE — 82962 GLUCOSE BLOOD TEST: CPT | Performed by: NURSE PRACTITIONER

## 2024-08-06 PROCEDURE — 3079F DIAST BP 80-89 MM HG: CPT | Performed by: NURSE PRACTITIONER

## 2024-08-06 PROCEDURE — 1160F RVW MEDS BY RX/DR IN RCRD: CPT | Performed by: NURSE PRACTITIONER

## 2024-08-06 ASSESSMENT — ENCOUNTER SYMPTOMS
MUSCULOSKELETAL NEGATIVE: 1
RESPIRATORY NEGATIVE: 1
GASTROINTESTINAL NEGATIVE: 1
CARDIOVASCULAR NEGATIVE: 1
NEUROLOGICAL NEGATIVE: 1
PSYCHIATRIC NEGATIVE: 1
CONSTITUTIONAL NEGATIVE: 1
ENDOCRINE NEGATIVE: 1

## 2024-08-06 NOTE — PROGRESS NOTES
Blaise Boss is a 83 y.o. here for a diabetic follow up exam.     Pt states they are doing well. Following a low carbohydrate diet and is active.     Up to date with eye and foot exams, pcp visits.     Last wt 177  Last bmi 25.11  Last aic 6.1 (5.5, 5.9, 6. 6.5)   Just saw neuro for follow up on his MG 7/24      Meds:  No meds - all tlc     Last ov - just s/p inpt stay for covid - ICU - 10 days and he was told MG exacerbation.  Last ov his b/p was running low and pcp stopped his lisinopril  And was told to keep records of his b/p and if Syst is >140 x 3 days then  to restart.     Dr sable neck and low back coming up  Compression hose feet and pvd r/o pvd  He has some c/w dependent rubor. No pain  Did buy compression hose to start 18-20 mmhg online as instructed.     He has log of Na+ intake daily vs systolic b/p and not much relationship  So we will investigate mechanical    Plan:  Pvd testing  [facility]  Echo         Lab Results   Component Value Date    POCGLU 98 07/16/2024    POCGLU 104 (A) 04/10/2024    POCGLU 119 (A) 12/20/2023    HGBA1C 6.1 07/16/2024    HGBA1C 5.5 04/10/2024    HGBA1C 5.9 (H) 03/17/2024    HGBA1C 6.0 12/20/2023    HGBA1C 6.5 (A) 03/29/2023     There were no vitals taken for this visit.   Wt Readings from Last 5 Encounters:   07/16/24 80.3 kg (177 lb)   07/15/24 80.7 kg (178 lb)   06/26/24 83 kg (183 lb)   04/10/24 77.6 kg (171 lb)   12/20/23 79.4 kg (175 lb)          Review of Systems   Constitutional: Negative.    HENT: Negative.     Respiratory: Negative.     Cardiovascular: Negative.    Gastrointestinal: Negative.    Endocrine: Negative.    Genitourinary: Negative.    Musculoskeletal: Negative.    Skin: Negative.    Neurological: Negative.    Psychiatric/Behavioral: Negative.          Physical Exam  Vitals and nursing note reviewed.   Constitutional:       Appearance: Normal appearance.   HENT:      Head: Normocephalic.   Eyes:      Pupils: Pupils are equal, round, and reactive to light.    Cardiovascular:      Rate and Rhythm: Normal rate and regular rhythm.      Heart sounds: Normal heart sounds.   Pulmonary:      Effort: Pulmonary effort is normal.      Breath sounds: Normal breath sounds.   Musculoskeletal:         General: Normal range of motion.      Right lower leg: Edema present.      Left lower leg: Edema present.      Comments: Rollator  '  Mild edema present mid lower leg to toes. Moderate dependent rubor andrea.     Skin:     General: Skin is warm and dry.   Neurological:      General: No focal deficit present.      Mental Status: He is alert and oriented to person, place, and time. Mental status is at baseline.   Psychiatric:         Mood and Affect: Mood normal.         Behavior: Behavior normal.         Thought Content: Thought content normal.         Judgment: Judgment normal.        Problem List Items Addressed This Visit             ICD-10-CM    Edema R60.9    Relevant Orders    Vascular US PVR with exercise    Transthoracic Echo (TTE) Complete     Other Visit Diagnoses         Codes    Type 2 diabetes mellitus with hyperglycemia, without long-term current use of insulin (Multi)    -  Primary E11.65    Relevant Orders    POCT glycosylated hemoglobin (Hb A1C) manually resulted (Completed)    POCT fingerstick glucose manually resulted (Completed)    Dependent rubor     L53.9    Relevant Orders    Vascular US PVR with exercise    Other disorders of arteries, arterioles and capillaries in diseases classified elsewhere (CMS-HCC)     I79.8    Relevant Orders    Vascular US PVR with exercise                Laura L Seaver, APRN-CNP    There is 1 Wet Read(s) to document. There are no Wet Read(s) to document.

## 2024-08-07 DIAGNOSIS — I10 ESSENTIAL HYPERTENSION: ICD-10-CM

## 2024-08-07 RX ORDER — LANCETS 33 GAUGE
EACH MISCELLANEOUS
Qty: 100 EACH | Refills: 0 | Status: SHIPPED | OUTPATIENT
Start: 2024-08-07

## 2024-08-07 RX ORDER — LISINOPRIL AND HYDROCHLOROTHIAZIDE 10; 12.5 MG/1; MG/1
1 TABLET ORAL DAILY
Qty: 90 TABLET | Refills: 0 | Status: SHIPPED | OUTPATIENT
Start: 2024-08-07

## 2024-09-13 ENCOUNTER — APPOINTMENT (OUTPATIENT)
Dept: PRIMARY CARE | Facility: CLINIC | Age: 84
End: 2024-09-13
Payer: COMMERCIAL

## 2024-09-13 VITALS
DIASTOLIC BLOOD PRESSURE: 68 MMHG | RESPIRATION RATE: 16 BRPM | SYSTOLIC BLOOD PRESSURE: 122 MMHG | HEART RATE: 54 BPM | BODY MASS INDEX: 25.18 KG/M2 | TEMPERATURE: 96.7 F | WEIGHT: 178 LBS

## 2024-09-13 DIAGNOSIS — E11.65 TYPE 2 DIABETES MELLITUS WITH HYPERGLYCEMIA, WITHOUT LONG-TERM CURRENT USE OF INSULIN: Primary | ICD-10-CM

## 2024-09-13 DIAGNOSIS — J06.9 UPPER RESPIRATORY TRACT INFECTION, UNSPECIFIED TYPE: ICD-10-CM

## 2024-09-13 DIAGNOSIS — G70.00 MYASTHENIA GRAVIS: ICD-10-CM

## 2024-09-13 DIAGNOSIS — L53.9 DEPENDENT RUBOR: ICD-10-CM

## 2024-09-13 DIAGNOSIS — R60.0 LOCALIZED EDEMA: ICD-10-CM

## 2024-09-13 DIAGNOSIS — I79.8 OTHER DISORDERS OF ARTERIES, ARTERIOLES AND CAPILLARIES IN DISEASES CLASSIFIED ELSEWHERE: ICD-10-CM

## 2024-09-13 LAB
POC FINGERSTICK BLOOD GLUCOSE: 128 MG/DL (ref 70–100)
POC HEMOGLOBIN A1C: 6 % (ref 4.2–6.5)

## 2024-09-13 PROCEDURE — 82962 GLUCOSE BLOOD TEST: CPT | Performed by: NURSE PRACTITIONER

## 2024-09-13 PROCEDURE — 3074F SYST BP LT 130 MM HG: CPT | Performed by: NURSE PRACTITIONER

## 2024-09-13 PROCEDURE — 99214 OFFICE O/P EST MOD 30 MIN: CPT | Performed by: NURSE PRACTITIONER

## 2024-09-13 PROCEDURE — 1160F RVW MEDS BY RX/DR IN RCRD: CPT | Performed by: NURSE PRACTITIONER

## 2024-09-13 PROCEDURE — 1159F MED LIST DOCD IN RCRD: CPT | Performed by: NURSE PRACTITIONER

## 2024-09-13 PROCEDURE — 83036 HEMOGLOBIN GLYCOSYLATED A1C: CPT | Performed by: NURSE PRACTITIONER

## 2024-09-13 PROCEDURE — 3078F DIAST BP <80 MM HG: CPT | Performed by: NURSE PRACTITIONER

## 2024-09-13 RX ORDER — DOXYCYCLINE 100 MG/1
100 CAPSULE ORAL 2 TIMES DAILY
Qty: 14 CAPSULE | Refills: 0 | Status: SHIPPED | OUTPATIENT
Start: 2024-09-13 | End: 2024-09-20

## 2024-09-13 NOTE — PROGRESS NOTES
Blaise Boss is a 84 y.o. here for a diabetic follow up exam.     Pt states they are doing well. Following a low carbohydrate diet and is active.     Up to date with eye and foot exams, pcp visits.     Last aic 6   - 6% today  Last wt 177  - 178    Meds - none all tlc - has glimepiride 1mg daily PRN- does not really use it    Edema - better with the compression hose and it is comforting on his legs.    Dr sung - back - pinched nerve - injections upcoming. He has pain lower back mid to right lower back. Possible future albation.     126/72  - watching salt   B/p this am - 126/68      Echo pending sept 24  Vascular as well     Doxy prophyl 'i was told to have an antibiotic if home sick or stuck in winter weather'. Please call us if you have to use and no help.     Lab Results   Component Value Date    POCGLU 128 (A) 09/13/2024    POCGLU 122 (A) 08/06/2024    POCGLU 98 07/16/2024    POCGLU 104 (A) 04/10/2024    POCGLU 119 (A) 12/20/2023    HGBA1C 6.0 09/13/2024    HGBA1C 6.0 08/06/2024    HGBA1C 6.1 07/16/2024    HGBA1C 5.5 04/10/2024    HGBA1C 5.9 (H) 03/17/2024     /70 (BP Location: Left arm, Patient Position: Sitting, BP Cuff Size: Adult)   Pulse 54   Temp 35.9 °C (96.7 °F) (Temporal)   Resp 16   Wt 80.7 kg (178 lb)   BMI 25.18 kg/m²    Wt Readings from Last 5 Encounters:   09/13/24 80.7 kg (178 lb)   08/06/24 80.3 kg (177 lb)   07/16/24 80.3 kg (177 lb)   07/15/24 80.7 kg (178 lb)   06/26/24 83 kg (183 lb)          Review of Systems   Constitutional: Negative.    HENT: Negative.     Respiratory: Negative.     Cardiovascular: Negative.    Gastrointestinal: Negative.    Endocrine: Negative.    Genitourinary: Negative.    Musculoskeletal: Negative.    Skin: Negative.    Neurological: Negative.    Psychiatric/Behavioral: Negative.          Physical Exam  Vitals and nursing note reviewed.   Constitutional:       Appearance: Normal appearance.   HENT:      Head: Normocephalic.   Eyes:      Pupils: Pupils  are equal, round, and reactive to light.   Cardiovascular:      Rate and Rhythm: Normal rate and regular rhythm.      Heart sounds: Normal heart sounds.   Pulmonary:      Effort: Pulmonary effort is normal.      Breath sounds: Normal breath sounds.   Skin:     General: Skin is warm and dry.   Neurological:      General: No focal deficit present.      Mental Status: He is alert and oriented to person, place, and time. Mental status is at baseline.   Psychiatric:         Mood and Affect: Mood normal.         Behavior: Behavior normal.         Thought Content: Thought content normal.         Judgment: Judgment normal.        Problem List Items Addressed This Visit             ICD-10-CM    Myasthenia gravis (Multi) G70.00    Edema R60.9     Other Visit Diagnoses         Codes    Type 2 diabetes mellitus with hyperglycemia, without long-term current use of insulin (Multi)    -  Primary E11.65    Relevant Orders    POCT glycosylated hemoglobin (Hb A1C) manually resulted (Completed)    POCT fingerstick glucose manually resulted (Completed)    Upper respiratory tract infection, unspecified type     J06.9    Relevant Medications    doxycycline (Vibramycin) 100 mg capsule    Dependent rubor     L53.9    Other disorders of arteries, arterioles and capillaries in diseases classified elsewhere (CMS-HCC)     I79.8           Laura L Seaver, APRN-CNP

## 2024-09-17 ASSESSMENT — ENCOUNTER SYMPTOMS
GASTROINTESTINAL NEGATIVE: 1
CARDIOVASCULAR NEGATIVE: 1
CONSTITUTIONAL NEGATIVE: 1
RESPIRATORY NEGATIVE: 1
NEUROLOGICAL NEGATIVE: 1
MUSCULOSKELETAL NEGATIVE: 1
PSYCHIATRIC NEGATIVE: 1
ENDOCRINE NEGATIVE: 1

## 2024-09-24 ENCOUNTER — HOSPITAL ENCOUNTER (OUTPATIENT)
Dept: VASCULAR MEDICINE | Facility: CLINIC | Age: 84
Discharge: HOME | End: 2024-09-24
Payer: COMMERCIAL

## 2024-09-24 ENCOUNTER — HOSPITAL ENCOUNTER (OUTPATIENT)
Dept: CARDIOLOGY | Facility: CLINIC | Age: 84
Discharge: HOME | End: 2024-09-24
Payer: COMMERCIAL

## 2024-09-24 DIAGNOSIS — L53.9 DEPENDENT RUBOR: ICD-10-CM

## 2024-09-24 DIAGNOSIS — I79.8 OTHER DISORDERS OF ARTERIES, ARTERIOLES AND CAPILLARIES IN DISEASES CLASSIFIED ELSEWHERE: ICD-10-CM

## 2024-09-24 DIAGNOSIS — R60.0 LOCALIZED EDEMA: ICD-10-CM

## 2024-09-24 PROCEDURE — 93306 TTE W/DOPPLER COMPLETE: CPT | Performed by: STUDENT IN AN ORGANIZED HEALTH CARE EDUCATION/TRAINING PROGRAM

## 2024-09-24 PROCEDURE — 93922 UPR/L XTREMITY ART 2 LEVELS: CPT | Performed by: SURGERY

## 2024-09-24 PROCEDURE — 93306 TTE W/DOPPLER COMPLETE: CPT

## 2024-09-24 PROCEDURE — 93922 UPR/L XTREMITY ART 2 LEVELS: CPT

## 2024-09-27 LAB
EJECTION FRACTION APICAL 4 CHAMBER: 68
EJECTION FRACTION: 69 %
LEFT ATRIUM VOLUME AREA LENGTH INDEX BSA: 25.8 ML/M2
LEFT VENTRICLE INTERNAL DIMENSION DIASTOLE: 3.6 CM (ref 3.5–6)
MITRAL VALVE E/A RATIO: 0.82
RIGHT VENTRICLE FREE WALL PEAK S': 12.2 CM/S
TRICUSPID ANNULAR PLANE SYSTOLIC EXCURSION: 2.8 CM

## 2024-09-30 ENCOUNTER — TELEPHONE (OUTPATIENT)
Dept: PRIMARY CARE | Facility: CLINIC | Age: 84
End: 2024-09-30
Payer: COMMERCIAL

## 2024-09-30 NOTE — TELEPHONE ENCOUNTER
----- Message from Laura Seaver sent at 9/27/2024  6:00 PM EDT -----  Pls inform pt - good/normal blood flow. No evidence of arterial occlusive disease either leg Ty lsnp

## 2024-09-30 NOTE — TELEPHONE ENCOUNTER
----- Message from Laura Seaver sent at 9/27/2024  6:02 PM EDT -----  Pls inform great heart, strong/valves great. Sean jones

## 2024-10-03 DIAGNOSIS — E11.65 TYPE 2 DIABETES MELLITUS WITH HYPERGLYCEMIA, WITHOUT LONG-TERM CURRENT USE OF INSULIN: Primary | ICD-10-CM

## 2024-10-03 RX ORDER — BLOOD SUGAR DIAGNOSTIC
STRIP MISCELLANEOUS
Qty: 100 EACH | Refills: 3 | Status: SHIPPED | OUTPATIENT
Start: 2024-10-03

## 2024-11-04 DIAGNOSIS — I10 ESSENTIAL HYPERTENSION: ICD-10-CM

## 2024-11-04 RX ORDER — LISINOPRIL AND HYDROCHLOROTHIAZIDE 10; 12.5 MG/1; MG/1
1 TABLET ORAL DAILY
Qty: 90 TABLET | Refills: 0 | Status: SHIPPED | OUTPATIENT
Start: 2024-11-04

## 2024-11-11 DIAGNOSIS — E11.43 TYPE 2 DIABETES MELLITUS WITH DIABETIC AUTONOMIC NEUROPATHY, WITHOUT LONG-TERM CURRENT USE OF INSULIN: ICD-10-CM

## 2024-11-12 RX ORDER — LANCETS 33 GAUGE
EACH MISCELLANEOUS
Qty: 100 EACH | Refills: 0 | Status: SHIPPED | OUTPATIENT
Start: 2024-11-12

## 2024-12-15 ASSESSMENT — ENCOUNTER SYMPTOMS: BACK PAIN: 1

## 2024-12-17 ENCOUNTER — APPOINTMENT (OUTPATIENT)
Dept: PRIMARY CARE | Facility: CLINIC | Age: 84
End: 2024-12-17
Payer: COMMERCIAL

## 2024-12-17 VITALS
WEIGHT: 184 LBS | SYSTOLIC BLOOD PRESSURE: 140 MMHG | RESPIRATION RATE: 16 BRPM | BODY MASS INDEX: 26.03 KG/M2 | DIASTOLIC BLOOD PRESSURE: 90 MMHG | TEMPERATURE: 97.7 F | HEART RATE: 60 BPM

## 2024-12-17 DIAGNOSIS — R73.9 HYPERGLYCEMIA: ICD-10-CM

## 2024-12-17 DIAGNOSIS — J06.9 UPPER RESPIRATORY TRACT INFECTION, UNSPECIFIED TYPE: ICD-10-CM

## 2024-12-17 DIAGNOSIS — G70.00 MYASTHENIA GRAVIS: ICD-10-CM

## 2024-12-17 DIAGNOSIS — E11.65 TYPE 2 DIABETES MELLITUS WITH HYPERGLYCEMIA, WITHOUT LONG-TERM CURRENT USE OF INSULIN: Primary | ICD-10-CM

## 2024-12-17 DIAGNOSIS — I10 ESSENTIAL HYPERTENSION: ICD-10-CM

## 2024-12-17 DIAGNOSIS — R60.0 LOCALIZED EDEMA: ICD-10-CM

## 2024-12-17 LAB
POC FINGERSTICK BLOOD GLUCOSE: 135 MG/DL (ref 70–100)
POC HEMOGLOBIN A1C: 6.2 % (ref 4.2–6.5)

## 2024-12-17 PROCEDURE — 1159F MED LIST DOCD IN RCRD: CPT | Performed by: NURSE PRACTITIONER

## 2024-12-17 PROCEDURE — 3077F SYST BP >= 140 MM HG: CPT | Performed by: NURSE PRACTITIONER

## 2024-12-17 PROCEDURE — 82962 GLUCOSE BLOOD TEST: CPT | Performed by: NURSE PRACTITIONER

## 2024-12-17 PROCEDURE — 99214 OFFICE O/P EST MOD 30 MIN: CPT | Performed by: NURSE PRACTITIONER

## 2024-12-17 PROCEDURE — 1160F RVW MEDS BY RX/DR IN RCRD: CPT | Performed by: NURSE PRACTITIONER

## 2024-12-17 PROCEDURE — 83036 HEMOGLOBIN GLYCOSYLATED A1C: CPT | Performed by: NURSE PRACTITIONER

## 2024-12-17 PROCEDURE — 1036F TOBACCO NON-USER: CPT | Performed by: NURSE PRACTITIONER

## 2024-12-17 PROCEDURE — 3080F DIAST BP >= 90 MM HG: CPT | Performed by: NURSE PRACTITIONER

## 2024-12-17 RX ORDER — DOXYCYCLINE 100 MG/1
100 CAPSULE ORAL 2 TIMES DAILY
Qty: 20 CAPSULE | Refills: 0 | Status: SHIPPED | OUTPATIENT
Start: 2024-12-17 | End: 2024-12-27

## 2024-12-17 RX ORDER — GLIMEPIRIDE 1 MG/1
1 TABLET ORAL
Qty: 30 TABLET | Refills: 0 | Status: SHIPPED | OUTPATIENT
Start: 2024-12-17

## 2024-12-17 RX ORDER — BLOOD SUGAR DIAGNOSTIC
1 STRIP MISCELLANEOUS DAILY
Qty: 100 EACH | Refills: 3 | Status: SHIPPED | OUTPATIENT
Start: 2024-12-17

## 2024-12-17 ASSESSMENT — ENCOUNTER SYMPTOMS
GASTROINTESTINAL NEGATIVE: 1
RESPIRATORY NEGATIVE: 1
NEUROLOGICAL NEGATIVE: 1
BACK PAIN: 1
ENDOCRINE NEGATIVE: 1
CARDIOVASCULAR NEGATIVE: 1
CONSTITUTIONAL NEGATIVE: 1
PSYCHIATRIC NEGATIVE: 1

## 2024-12-17 NOTE — PROGRESS NOTES
Answers submitted by the patient for this visit:  Back Pain Questionnaire (Submitted on 12/15/2024)  Chief Complaint: Back pain  Radiates to: right thigh

## 2024-12-17 NOTE — PROGRESS NOTES
Blaise Boss is a 84 y.o. here for a diabetic follow up exam.     Pt states they are doing well. Following a low carbohydrate diet and is active.     Up to date with eye and foot exams, pcp visits.     Last wt 178 - todays wt is 184  Last aic 6.0 (prior 6.0)    Also, pt c/o back pain.    Edema is better using his compression hose.   Saw dr sung for his back pain. 2 procedures done. Now approval for ablation.     Meds:  Glimepiride 1mg daily only takes it PRN bs over     Fbs - 122    Pressures at home he is doing well 132/80 -- top diastoic 94x1 and top systolic 148    Pp breakfast  120's    He and dr le have decided to take a blood pressure pill -- over 140x 2 days or more.     Infusions for his MG as well  Dr hanks as well - ST all normal in past                Lab Results   Component Value Date    POCGLU 135 (A) 12/17/2024    POCGLU 128 (A) 09/13/2024    POCGLU 122 (A) 08/06/2024    POCGLU 98 07/16/2024    POCGLU 104 (A) 04/10/2024    HGBA1C 6.2 12/17/2024    HGBA1C 6.0 09/13/2024    HGBA1C 6.0 08/06/2024    HGBA1C 6.1 07/16/2024    HGBA1C 5.5 04/10/2024     /90 (BP Location: Left arm, Patient Position: Sitting)   Pulse 60   Temp 36.5 °C (97.7 °F) (Temporal)   Resp 16   Wt 83.5 kg (184 lb)   BMI 26.03 kg/m²    Wt Readings from Last 5 Encounters:   12/17/24 83.5 kg (184 lb)   09/13/24 80.7 kg (178 lb)   08/06/24 80.3 kg (177 lb)   07/16/24 80.3 kg (177 lb)   07/15/24 80.7 kg (178 lb)          Lab Results   Component Value Date    CREATU 249.0 08/25/2022    MICROALBCREA 29.0 08/25/2022        Review of Systems   Constitutional: Negative.    HENT: Negative.     Respiratory: Negative.     Cardiovascular: Negative.    Gastrointestinal: Negative.    Endocrine: Negative.    Genitourinary: Negative.    Musculoskeletal:  Positive for back pain.   Skin: Negative.    Neurological: Negative.    Psychiatric/Behavioral: Negative.          Physical Exam  Vitals and nursing note reviewed.   Constitutional:        Appearance: Normal appearance.   HENT:      Head: Normocephalic.   Eyes:      Pupils: Pupils are equal, round, and reactive to light.   Cardiovascular:      Rate and Rhythm: Normal rate and regular rhythm.      Heart sounds: Normal heart sounds.   Pulmonary:      Effort: Pulmonary effort is normal.      Breath sounds: Normal breath sounds.   Skin:     General: Skin is warm and dry.   Neurological:      General: No focal deficit present.      Mental Status: He is alert and oriented to person, place, and time. Mental status is at baseline.   Psychiatric:         Mood and Affect: Mood normal.         Behavior: Behavior normal.         Thought Content: Thought content normal.         Judgment: Judgment normal.        Problem List Items Addressed This Visit             ICD-10-CM    Essential hypertension I10    Myasthenia gravis G70.00    Edema R60.9     Other Visit Diagnoses         Codes    Type 2 diabetes mellitus with hyperglycemia, without long-term current use of insulin    -  Primary E11.65    Relevant Orders    POCT glycosylated hemoglobin (Hb A1C) manually resulted (Completed)    POCT fingerstick glucose manually resulted (Completed)    Hyperglycemia     R73.9    Relevant Orders    Albumin-Creatinine Ratio, Urine Random           Laura L Seaver, APRN-CNP   Answers submitted by the patient for this visit:  Back Pain Questionnaire (Submitted on 12/15/2024)  Chief Complaint: Back pain  Radiates to: right thigh  Answers submitted by the patient for this visit:  Back Pain Questionnaire (Submitted on 12/15/2024)  Chief Complaint: Back pain  Radiates to: right thigh

## 2025-01-08 ENCOUNTER — TELEPHONE (OUTPATIENT)
Dept: PRIMARY CARE | Facility: CLINIC | Age: 85
End: 2025-01-08
Payer: COMMERCIAL

## 2025-01-08 DIAGNOSIS — G70.00 MYASTHENIA GRAVIS: ICD-10-CM

## 2025-01-08 NOTE — TELEPHONE ENCOUNTER
Patient states he has been seeing neurologist dr christiane petit who is moving and leaving his practice so wondering if you had a recommendation on someone at  he could see for neuro?

## 2025-01-10 NOTE — TELEPHONE ENCOUNTER
I found the following neuromuscular neurologist that specalist in that condition  Lex Sharif    I did relay this to margarito who is scheduled to see you 1/15/25 and will discuss getting a referral with you then.

## 2025-01-15 ENCOUNTER — APPOINTMENT (OUTPATIENT)
Dept: PRIMARY CARE | Facility: CLINIC | Age: 85
End: 2025-01-15
Payer: COMMERCIAL

## 2025-01-15 VITALS
WEIGHT: 183 LBS | SYSTOLIC BLOOD PRESSURE: 140 MMHG | DIASTOLIC BLOOD PRESSURE: 72 MMHG | BODY MASS INDEX: 26.2 KG/M2 | HEIGHT: 70 IN | TEMPERATURE: 97.1 F | OXYGEN SATURATION: 97 % | HEART RATE: 54 BPM

## 2025-01-15 DIAGNOSIS — G70.00 MG (MYASTHENIA GRAVIS) (MULTI): ICD-10-CM

## 2025-01-15 DIAGNOSIS — M54.16 LUMBAR RADICULOPATHY: ICD-10-CM

## 2025-01-15 DIAGNOSIS — N40.1 BENIGN PROSTATIC HYPERPLASIA WITH URINARY HESITANCY: ICD-10-CM

## 2025-01-15 DIAGNOSIS — E78.2 MIXED HYPERLIPIDEMIA: ICD-10-CM

## 2025-01-15 DIAGNOSIS — G89.4 CHRONIC PAIN SYNDROME: ICD-10-CM

## 2025-01-15 DIAGNOSIS — I10 ESSENTIAL HYPERTENSION: ICD-10-CM

## 2025-01-15 DIAGNOSIS — Z00.00 MEDICARE ANNUAL WELLNESS VISIT, SUBSEQUENT: Primary | ICD-10-CM

## 2025-01-15 DIAGNOSIS — E53.8 VITAMIN B 12 DEFICIENCY: ICD-10-CM

## 2025-01-15 DIAGNOSIS — Z00.00 WELLNESS EXAMINATION: ICD-10-CM

## 2025-01-15 DIAGNOSIS — R60.9 EDEMA, UNSPECIFIED TYPE: ICD-10-CM

## 2025-01-15 DIAGNOSIS — R60.0 LOCALIZED EDEMA: ICD-10-CM

## 2025-01-15 DIAGNOSIS — Z12.5 PROSTATE CANCER SCREENING: ICD-10-CM

## 2025-01-15 DIAGNOSIS — I79.8 OTHER DISORDERS OF ARTERIES, ARTERIOLES AND CAPILLARIES IN DISEASES CLASSIFIED ELSEWHERE: ICD-10-CM

## 2025-01-15 DIAGNOSIS — E11.9 TYPE 2 DIABETES MELLITUS WITHOUT COMPLICATION, WITHOUT LONG-TERM CURRENT USE OF INSULIN (MULTI): ICD-10-CM

## 2025-01-15 DIAGNOSIS — N20.0 RENAL STONES: ICD-10-CM

## 2025-01-15 DIAGNOSIS — M54.12 CERVICAL RADICULOPATHY: ICD-10-CM

## 2025-01-15 DIAGNOSIS — K22.89 ESOPHAGEAL DILATATION: ICD-10-CM

## 2025-01-15 DIAGNOSIS — R79.9 ABNORMAL FINDING OF BLOOD CHEMISTRY, UNSPECIFIED: ICD-10-CM

## 2025-01-15 DIAGNOSIS — R39.11 BENIGN PROSTATIC HYPERPLASIA WITH URINARY HESITANCY: ICD-10-CM

## 2025-01-15 PROCEDURE — 3077F SYST BP >= 140 MM HG: CPT | Performed by: INTERNAL MEDICINE

## 2025-01-15 PROCEDURE — 1124F ACP DISCUSS-NO DSCNMKR DOCD: CPT | Performed by: INTERNAL MEDICINE

## 2025-01-15 PROCEDURE — 1159F MED LIST DOCD IN RCRD: CPT | Performed by: INTERNAL MEDICINE

## 2025-01-15 PROCEDURE — 1170F FXNL STATUS ASSESSED: CPT | Performed by: INTERNAL MEDICINE

## 2025-01-15 PROCEDURE — G0439 PPPS, SUBSEQ VISIT: HCPCS | Performed by: INTERNAL MEDICINE

## 2025-01-15 PROCEDURE — 99213 OFFICE O/P EST LOW 20 MIN: CPT | Performed by: INTERNAL MEDICINE

## 2025-01-15 PROCEDURE — 3078F DIAST BP <80 MM HG: CPT | Performed by: INTERNAL MEDICINE

## 2025-01-15 PROCEDURE — 99397 PER PM REEVAL EST PAT 65+ YR: CPT | Performed by: INTERNAL MEDICINE

## 2025-01-15 ASSESSMENT — ENCOUNTER SYMPTOMS
FEVER: 0
CHOKING: 0
COUGH: 0
SHORTNESS OF BREATH: 0
PSYCHIATRIC NEGATIVE: 1
PHOTOPHOBIA: 0
PALPITATIONS: 0
FACIAL ASYMMETRY: 0
ENDOCRINE NEGATIVE: 1
HEMATOLOGIC/LYMPHATIC NEGATIVE: 1
ARTHRALGIAS: 0
ALLERGIC/IMMUNOLOGIC NEGATIVE: 1
LIGHT-HEADEDNESS: 0
HEADACHES: 0
CHILLS: 0
STRIDOR: 0
EYE ITCHING: 0
FATIGUE: 0
EYE REDNESS: 0
ACTIVITY CHANGE: 0
GASTROINTESTINAL NEGATIVE: 1
DIZZINESS: 0
APPETITE CHANGE: 0
CHEST TIGHTNESS: 0
DIAPHORESIS: 0
NUMBNESS: 0
UNEXPECTED WEIGHT CHANGE: 0
APNEA: 0
EYE DISCHARGE: 0
WHEEZING: 0
EYE PAIN: 0

## 2025-01-15 ASSESSMENT — PATIENT HEALTH QUESTIONNAIRE - PHQ9: 2. FEELING DOWN, DEPRESSED OR HOPELESS: NOT AT ALL

## 2025-01-15 ASSESSMENT — ACTIVITIES OF DAILY LIVING (ADL)
GROCERY_SHOPPING: INDEPENDENT
BATHING: INDEPENDENT
TAKING_MEDICATION: INDEPENDENT
MANAGING_FINANCES: INDEPENDENT
DOING_HOUSEWORK: INDEPENDENT
DRESSING: INDEPENDENT

## 2025-01-15 NOTE — ASSESSMENT & PLAN NOTE
Good   Orders:    TSH with reflex to Free T4 if abnormal; Future    Urinalysis with Reflex Culture and Microscopic; Future

## 2025-01-15 NOTE — ASSESSMENT & PLAN NOTE
Controlled last aic   Orders:    TSH with reflex to Free T4 if abnormal; Future    Urinalysis with Reflex Culture and Microscopic; Future

## 2025-01-15 NOTE — ASSESSMENT & PLAN NOTE
Tiny artery  pvr nl  Orders:    TSH with reflex to Free T4 if abnormal; Future    Urinalysis with Reflex Culture and Microscopic; Future

## 2025-01-15 NOTE — ASSESSMENT & PLAN NOTE
Stable   Orders:    TSH with reflex to Free T4 if abnormal; Future    Urinalysis with Reflex Culture and Microscopic; Future

## 2025-01-15 NOTE — ASSESSMENT & PLAN NOTE
On b 12  Orders:    TSH with reflex to Free T4 if abnormal; Future    Urinalysis with Reflex Culture and Microscopic; Future

## 2025-01-15 NOTE — ASSESSMENT & PLAN NOTE
Orders:    Prostate Specific Antigen, Screen; Future    TSH with reflex to Free T4 if abnormal; Future    Urinalysis with Reflex Culture and Microscopic; Future

## 2025-01-15 NOTE — ASSESSMENT & PLAN NOTE
ok  Orders:    TSH with reflex to Free T4 if abnormal; Future    Urinalysis with Reflex Culture and Microscopic; Future

## 2025-01-15 NOTE — PROGRESS NOTES
Subjective   Reason for Visit: Blaise Boss is an 84 y.o. male here for a Medicare Wellness visit.     Past Medical, Surgical, and Family History reviewed and updated in chart.    Reviewed all medications by prescribing practitioner or clinical pharmacist (such as prescriptions, OTCs, herbal therapies and supplements) and documented in the medical record.    HPI The patient is doing well , feels well, no specific complaints.   Tolerating and taking med's with no significant side effects.   No other issues noted on questions.     He needs a new neuro ,he has names looks like Dr Braga  He feels ok  He is active     PMHX, PSHX, ALL, SOCHX, PRE MED ALL REVIEWED     MMSE 30 30     PHQ 2 NL     NO FALLS NOTED     PREVENTIVE MEDICINE:  Mammogram  Dexa  Psa  Colonoscopy   VACCINES REVIEWED       Contraindication due to Myasthenia Gravis   Magnesium Other: See Comments   Contraindication due to Myasthenia Gravis   Norfloxacin Other: See Comments   Contraindication due to Myasthenia Gravis   Current Outpatient Medications on File Prior to Visit   Medication Sig Dispense Refill   Alpha tocopherol (Vitamin E) 200 units capsule Take 200 Units by mouth in the morning.   brimonidine (AlphaGAN P) 0.2 % ophthalmic solution Administer 1 drop into the right eye in the morning and 1 drop in the evening and 1 drop before bedtime.   cholecalciferol 50 MCG (2000 UT) capsule Take 2,000 Units by mouth Daily   Cyanocobalamin (VITAMIN B 12 PO) Vitamin B 12   lisinopril-hydroCHLOROthiazide 10-12.5 MG tablet Take 1 tablet by mouth if needed   melatonin 5 MG tablet Take 5 mg by mouth Daily as needed.   Multiple Vitamin (Multi Vitamin) tablet 1 (one) time each day at the same time.   mupirocin (Bactroban) 2 % ointment 1 application every 12 (twelve) hours.   mycophenolate (Cellcept) 500 MG tablet Take 1 tablet (500 mg) by mouth in the morning and 1 tablet (500 mg) before bedtime. (Patient taking differently: Take 500 mg by mouth Daily) 60  tablet 11   pyridostigmine (Mestinon) 180 MG ER tablet Take 180 mg by mouth Daily as needed (weakness) Do not crush or chew.   pyridostigmine (Mestinon) 60 MG tablet Take 1 tablet (60 mg) by mouth in the morning and 1 tablet (60 mg) in the evening and 1 tablet (60 mg) before bedtime. 270 tablet 3   ravulizumab (Ultomiris) 300 MG/3ML injection Infuse into a venous catheter.   zinc gluconate 50 MG tablet Take 50 mg by mouth in the morning.   meningococcal A / C / Y and W-135 tetanus conjugate (MenQuadfi) vaccine as directed Intramuscular Day 1 and repeat again in 2 months for 365 days   Pyridoxine HCl (Vitamin B6) 100 MG tablet Take 100 mg by mouth 1 (one) time each day at the same time.     No current facility-administered medications on file prior to visit.     Past Medical History:   Diagnosis Date   Bradycardia, unspecified   Chest pain   [facility]   Diabetes mellitus with hyperglycemia (CMS/HCC)   Diverticulitis   Dizziness   [facility]   Drooping eyelid, left   [facility]   High blood pressure (CMS/HCC)   Hydrocele   [facility]   Kidney stones   Lumbar stenosis with neurogenic claudication   Myasthenia gravis with exacerbation (CMS/HCC)   Raynaud's disease     Past Surgical History:   Procedure Laterality Date   COLONOSCOPY   CYSTOSCOPY   CYSTOSCOPY   HYDROCELE ASPIRATION   LAMINECTOMY   Spinal Stenosis Surgery   MICRODISCECTOMY LUMBAR 8/1/2022   MR ANGIOGRAM HEAD WO IV CONTRAST 2/22/2019   MR ANGIOGRAM HEAD WO IV CONTRAST 2/22/2019   OTHER SURGICAL HISTORY   removal of stent   OTHER SURGICAL HISTORY   Left external levator resection   OTHER SURGICAL HISTORY   Microdecompression L2-4     Family History   Problem Relation Name Age of Onset   Diabetes Mother Ban Taylorronda   Stroke Mother Ban Taylorronda   Alcohol abuse Father   No Known Problems Sister   No Known Problems Brother   brother 2 still living     Social History     Tobacco Use   Smoking status: Never   Smokeless tobacco: Never   Substance Use Topics   Alcohol use: Never  "  Comment: Caffeine intake: coffee(every once in a while)     ALLERGIES:   Patient has no known allergies.    Patient Care Team:  Brooks Haq MD as PCP - General     Review of Systems   Constitutional:  Negative for activity change, appetite change, chills, diaphoresis, fatigue, fever and unexpected weight change.   HENT: Negative.     Eyes:  Negative for photophobia, pain, discharge, redness, itching and visual disturbance.   Respiratory:  Negative for apnea, cough, choking, chest tightness, shortness of breath, wheezing and stridor.    Cardiovascular:  Negative for chest pain, palpitations and leg swelling.   Gastrointestinal: Negative.    Endocrine: Negative.    Genitourinary: Negative.    Musculoskeletal:  Negative for arthralgias.   Skin: Negative.    Allergic/Immunologic: Negative.    Neurological:  Negative for dizziness, facial asymmetry, light-headedness, numbness and headaches.   Hematological: Negative.    Psychiatric/Behavioral: Negative.         Objective   Vitals:  /72 (BP Location: Left arm, Patient Position: Sitting, BP Cuff Size: Adult)   Pulse 54   Temp 36.2 °C (97.1 °F) (Temporal)   Ht 1.778 m (5' 10\")   Wt 83 kg (183 lb)   SpO2 97%   BMI 26.26 kg/m²       Physical Exam  Constitutional:       Appearance: Normal appearance.   HENT:      Head: Normocephalic and atraumatic.      Right Ear: Tympanic membrane normal.      Left Ear: Tympanic membrane normal.      Nose: Nose normal.   Eyes:      Extraocular Movements: Extraocular movements intact.      Conjunctiva/sclera: Conjunctivae normal.      Pupils: Pupils are equal, round, and reactive to light.   Cardiovascular:      Rate and Rhythm: Normal rate and regular rhythm.      Pulses: Normal pulses.      Heart sounds: Normal heart sounds.   Pulmonary:      Effort: Pulmonary effort is normal.      Breath sounds: Normal breath sounds.   Abdominal:      General: Abdomen is flat. Bowel sounds are normal.      Palpations: Abdomen is soft. "   Musculoskeletal:         General: Normal range of motion.      Cervical back: Normal range of motion and neck supple.   Skin:     General: Skin is warm and dry.   Neurological:      General: No focal deficit present.      Mental Status: He is oriented to person, place, and time. Mental status is at baseline.   Psychiatric:         Mood and Affect: Mood normal.         Behavior: Behavior normal.         Thought Content: Thought content normal.         Judgment: Judgment normal.         Assessment & Plan  Medicare annual wellness visit, subsequent    Orders:    TSH with reflex to Free T4 if abnormal; Future    Urinalysis with Reflex Culture and Microscopic; Future    Cervical radiculopathy  ok  Orders:    TSH with reflex to Free T4 if abnormal; Future    Urinalysis with Reflex Culture and Microscopic; Future    Renal stones  Stable   Orders:    TSH with reflex to Free T4 if abnormal; Future    Urinalysis with Reflex Culture and Microscopic; Future    Benign prostatic hyperplasia with urinary hesitancy  Ok   Orders:    TSH with reflex to Free T4 if abnormal; Future    Urinalysis with Reflex Culture and Microscopic; Future    Vitamin B 12 deficiency  On b 12  Orders:    TSH with reflex to Free T4 if abnormal; Future    Urinalysis with Reflex Culture and Microscopic; Future    Prostate cancer screening    Orders:    Prostate Specific Antigen, Screen; Future    TSH with reflex to Free T4 if abnormal; Future    Urinalysis with Reflex Culture and Microscopic; Future    Esophageal dilatation  ok  Orders:    TSH with reflex to Free T4 if abnormal; Future    Urinalysis with Reflex Culture and Microscopic; Future    Type 2 diabetes mellitus without complication, without long-term current use of insulin (Multi)  Controlled last aic   Orders:    TSH with reflex to Free T4 if abnormal; Future    Urinalysis with Reflex Culture and Microscopic; Future    Wellness examination    Orders:    CBC; Future    Lipid Panel; Future     Comprehensive Metabolic Panel; Future    TSH with reflex to Free T4 if abnormal; Future    Urinalysis with Reflex Culture and Microscopic; Future    Mixed hyperlipidemia  ok  Orders:    TSH with reflex to Free T4 if abnormal; Future    Urinalysis with Reflex Culture and Microscopic; Future    MG (myasthenia gravis) (Multi)  See neuro on injections  Orders:    TSH with reflex to Free T4 if abnormal; Future    Urinalysis with Reflex Culture and Microscopic; Future    Edema, unspecified type  Good   Orders:    TSH with reflex to Free T4 if abnormal; Future    Urinalysis with Reflex Culture and Microscopic; Future    Localized edema    Orders:    TSH with reflex to Free T4 if abnormal; Future    Urinalysis with Reflex Culture and Microscopic; Future    Other disorders of arteries, arterioles and capillaries in diseases classified elsewhere  Tiny artery  pvr nl  Orders:    TSH with reflex to Free T4 if abnormal; Future    Urinalysis with Reflex Culture and Microscopic; Future    Essential hypertension  good  Orders:    TSH with reflex to Free T4 if abnormal; Future    Urinalysis with Reflex Culture and Microscopic; Future    Lumbar radiculopathy  ok  Orders:    TSH with reflex to Free T4 if abnormal; Future    Urinalysis with Reflex Culture and Microscopic; Future    Abnormal finding of blood chemistry, unspecified    Orders:    CBC; Future    Chronic pain syndrome  See pain

## 2025-02-04 DIAGNOSIS — I10 ESSENTIAL HYPERTENSION: ICD-10-CM

## 2025-02-04 RX ORDER — LISINOPRIL AND HYDROCHLOROTHIAZIDE 10; 12.5 MG/1; MG/1
1 TABLET ORAL DAILY
Qty: 90 TABLET | Refills: 0 | Status: SHIPPED | OUTPATIENT
Start: 2025-02-04

## 2025-02-26 DIAGNOSIS — E11.43 TYPE 2 DIABETES MELLITUS WITH DIABETIC AUTONOMIC NEUROPATHY, WITHOUT LONG-TERM CURRENT USE OF INSULIN: ICD-10-CM

## 2025-02-27 RX ORDER — LANCETS 33 GAUGE
EACH MISCELLANEOUS
Qty: 100 EACH | Refills: 0 | Status: SHIPPED | OUTPATIENT
Start: 2025-02-27

## 2025-03-18 ENCOUNTER — APPOINTMENT (OUTPATIENT)
Dept: PRIMARY CARE | Facility: CLINIC | Age: 85
End: 2025-03-18
Payer: COMMERCIAL

## 2025-03-18 VITALS
SYSTOLIC BLOOD PRESSURE: 121 MMHG | HEART RATE: 52 BPM | WEIGHT: 182 LBS | DIASTOLIC BLOOD PRESSURE: 75 MMHG | BODY MASS INDEX: 26.96 KG/M2 | TEMPERATURE: 97.6 F | RESPIRATION RATE: 16 BRPM | HEIGHT: 69 IN

## 2025-03-18 DIAGNOSIS — G70.00 MG (MYASTHENIA GRAVIS) (MULTI): ICD-10-CM

## 2025-03-18 DIAGNOSIS — E11.65 TYPE 2 DIABETES MELLITUS WITH HYPERGLYCEMIA, WITHOUT LONG-TERM CURRENT USE OF INSULIN: Primary | ICD-10-CM

## 2025-03-18 DIAGNOSIS — M54.16 LUMBAR RADICULOPATHY: ICD-10-CM

## 2025-03-18 LAB
POC FINGERSTICK BLOOD GLUCOSE: 134 MG/DL (ref 70–100)
POC HEMOGLOBIN A1C: 6.2 % (ref 4.2–6.5)

## 2025-03-18 PROCEDURE — 99215 OFFICE O/P EST HI 40 MIN: CPT | Performed by: NURSE PRACTITIONER

## 2025-03-18 PROCEDURE — 3078F DIAST BP <80 MM HG: CPT | Performed by: NURSE PRACTITIONER

## 2025-03-18 PROCEDURE — 1160F RVW MEDS BY RX/DR IN RCRD: CPT | Performed by: NURSE PRACTITIONER

## 2025-03-18 PROCEDURE — 3074F SYST BP LT 130 MM HG: CPT | Performed by: NURSE PRACTITIONER

## 2025-03-18 PROCEDURE — 83036 HEMOGLOBIN GLYCOSYLATED A1C: CPT | Performed by: NURSE PRACTITIONER

## 2025-03-18 PROCEDURE — 82962 GLUCOSE BLOOD TEST: CPT | Performed by: NURSE PRACTITIONER

## 2025-03-18 PROCEDURE — 1159F MED LIST DOCD IN RCRD: CPT | Performed by: NURSE PRACTITIONER

## 2025-03-18 ASSESSMENT — ENCOUNTER SYMPTOMS
NEUROLOGICAL NEGATIVE: 1
ENDOCRINE NEGATIVE: 1
MUSCULOSKELETAL NEGATIVE: 1
RESPIRATORY NEGATIVE: 1
GASTROINTESTINAL NEGATIVE: 1
CARDIOVASCULAR NEGATIVE: 1
CONSTITUTIONAL NEGATIVE: 1
PSYCHIATRIC NEGATIVE: 1

## 2025-03-18 NOTE — PROGRESS NOTES
" Blaise Boss is a 84 y.o. here for a diabetic follow up exam.     Pt states they are doing well. Following a low carbohydrate diet and is active.     Up to date with eye and foot exams, pcp visits.     Last wt 183  -182 today  Last bmi 26.26  Last aic 6.2 (6.0, 6.0, 6.1)  - last aic 6.2%   Dr sable for back hx, post surgery - has had 3 injections with improvement.  And doing home exercises  Does not need his walker any longer as he did complete PT - NEXT POSSIBLE IN 6MONTHS WILL WORK ON THE MID/HIGHER BACK.     Dr petit started the ultomiris - gets every 8 weeks.  Dr katz started ultomiris injection  Dr santiago does not work with ultomiris he wanted an [facility]  In meantime - will be continuing at .  Working with dr sheth neurology for MG - pending appt may 5th      Meds:  Glimepiride 1mg daily at breakfast - PRN only elevated bs    [address]  Pp - cheerios, cinimin, blueberries/walnuts, smart one - 134    144 systolic  Diastolic - 66              Lab Results   Component Value Date    POCGLU 134 (A) 03/18/2025    POCGLU 135 (A) 12/17/2024    POCGLU 128 (A) 09/13/2024    POCGLU 122 (A) 08/06/2024    POCGLU 98 07/16/2024    HGBA1C 6.2 03/18/2025    HGBA1C 6.2 12/17/2024    HGBA1C 6.0 09/13/2024    HGBA1C 6.0 08/06/2024    HGBA1C 6.1 07/16/2024     /75   Pulse 52   Temp 36.4 °C (97.6 °F) (Temporal)   Resp 16   Ht 1.753 m (5' 9\")   Wt 82.6 kg (182 lb)   BMI 26.88 kg/m²    Wt Readings from Last 5 Encounters:   03/18/25 82.6 kg (182 lb)   01/15/25 83 kg (183 lb)   12/17/24 83.5 kg (184 lb)   09/13/24 80.7 kg (178 lb)   08/06/24 80.3 kg (177 lb)          Lab Results   Component Value Date    CREATU 249.0 08/25/2022    MICROALBCREA 29.0 08/25/2022        Review of Systems   Constitutional: Negative.    HENT: Negative.     Respiratory: Negative.     Cardiovascular: Negative.    Gastrointestinal: Negative.    Endocrine: Negative.    Genitourinary: Negative.    Musculoskeletal: Negative.    Skin: Negative.  "   Neurological: Negative.    Psychiatric/Behavioral: Negative.          Physical Exam  Vitals and nursing note reviewed.   Constitutional:       Appearance: Normal appearance.   HENT:      Head: Normocephalic.   Eyes:      Pupils: Pupils are equal, round, and reactive to light.   Cardiovascular:      Rate and Rhythm: Normal rate and regular rhythm.      Heart sounds: Normal heart sounds.   Pulmonary:      Effort: Pulmonary effort is normal.      Breath sounds: Normal breath sounds.   Skin:     General: Skin is warm and dry.   Neurological:      General: No focal deficit present.      Mental Status: He is alert and oriented to person, place, and time. Mental status is at baseline.   Psychiatric:         Mood and Affect: Mood normal.         Behavior: Behavior normal.         Thought Content: Thought content normal.         Judgment: Judgment normal.        Answers submitted by the patient for this visit:  Diabetes Questionnaire (Submitted on 3/12/2025)  Chief Complaint: Diabetes problem  Diabetes type: type 2  MedicAlert ID: No    Problem List Items Addressed This Visit       Lumbar radiculopathy    Relevant Orders    Referral to Physical Therapy     Other Visit Diagnoses       Type 2 diabetes mellitus with hyperglycemia, without long-term current use of insulin    -  Primary    Relevant Orders    POCT glycosylated hemoglobin (Hb A1C) manually resulted (Completed)    POCT fingerstick glucose manually resulted (Completed)    Albumin-Creatinine Ratio, Urine Random    MG (myasthenia gravis) (Multi)        Relevant Orders    Referral to Physical Therapy             Laura L Seaver, APRN-CNP

## 2025-06-09 DIAGNOSIS — E11.43 TYPE 2 DIABETES MELLITUS WITH DIABETIC AUTONOMIC NEUROPATHY, WITHOUT LONG-TERM CURRENT USE OF INSULIN: ICD-10-CM

## 2025-06-09 RX ORDER — LANCETS 33 GAUGE
EACH MISCELLANEOUS
Qty: 100 EACH | Refills: 0 | Status: SHIPPED | OUTPATIENT
Start: 2025-06-09

## 2025-06-18 ENCOUNTER — APPOINTMENT (OUTPATIENT)
Dept: PRIMARY CARE | Facility: CLINIC | Age: 85
End: 2025-06-18
Payer: COMMERCIAL

## 2025-06-18 VITALS
DIASTOLIC BLOOD PRESSURE: 80 MMHG | BODY MASS INDEX: 25.91 KG/M2 | SYSTOLIC BLOOD PRESSURE: 164 MMHG | WEIGHT: 181 LBS | HEIGHT: 70 IN | HEART RATE: 50 BPM | RESPIRATION RATE: 16 BRPM

## 2025-06-18 DIAGNOSIS — R73.9 HYPERGLYCEMIA: ICD-10-CM

## 2025-06-18 DIAGNOSIS — E11.65 TYPE 2 DIABETES MELLITUS WITH HYPERGLYCEMIA, WITHOUT LONG-TERM CURRENT USE OF INSULIN: Primary | ICD-10-CM

## 2025-06-18 DIAGNOSIS — G70.00 MG (MYASTHENIA GRAVIS) (MULTI): ICD-10-CM

## 2025-06-18 DIAGNOSIS — M54.16 LUMBAR RADICULOPATHY: ICD-10-CM

## 2025-06-18 LAB
POC FINGERSTICK BLOOD GLUCOSE: 114 MG/DL (ref 70–100)
POC HEMOGLOBIN A1C: 6.1 % (ref 4.2–6.5)

## 2025-06-18 PROCEDURE — 1159F MED LIST DOCD IN RCRD: CPT | Performed by: NURSE PRACTITIONER

## 2025-06-18 PROCEDURE — 1036F TOBACCO NON-USER: CPT | Performed by: NURSE PRACTITIONER

## 2025-06-18 PROCEDURE — 83036 HEMOGLOBIN GLYCOSYLATED A1C: CPT | Performed by: NURSE PRACTITIONER

## 2025-06-18 PROCEDURE — 82962 GLUCOSE BLOOD TEST: CPT | Performed by: NURSE PRACTITIONER

## 2025-06-18 PROCEDURE — 3079F DIAST BP 80-89 MM HG: CPT | Performed by: NURSE PRACTITIONER

## 2025-06-18 PROCEDURE — 3077F SYST BP >= 140 MM HG: CPT | Performed by: NURSE PRACTITIONER

## 2025-06-18 PROCEDURE — 99214 OFFICE O/P EST MOD 30 MIN: CPT | Performed by: NURSE PRACTITIONER

## 2025-06-18 RX ORDER — IBUPROFEN 200 MG
CAPSULE ORAL
Qty: 100 STRIP | Refills: 3 | Status: SHIPPED | OUTPATIENT
Start: 2025-06-18

## 2025-06-18 RX ORDER — LANCETS
EACH MISCELLANEOUS
Qty: 100 EACH | Refills: 3 | Status: SHIPPED | OUTPATIENT
Start: 2025-06-18

## 2025-06-18 RX ORDER — INSULIN PUMP SYRINGE, 3 ML
EACH MISCELLANEOUS
Qty: 1 EACH | Refills: 1 | Status: SHIPPED | OUTPATIENT
Start: 2025-06-18

## 2025-06-18 ASSESSMENT — ENCOUNTER SYMPTOMS
GASTROINTESTINAL NEGATIVE: 1
CARDIOVASCULAR NEGATIVE: 1
ENDOCRINE NEGATIVE: 1
RESPIRATORY NEGATIVE: 1
PSYCHIATRIC NEGATIVE: 1
CONSTITUTIONAL NEGATIVE: 1
MUSCULOSKELETAL NEGATIVE: 1
NEUROLOGICAL NEGATIVE: 1

## 2025-06-18 NOTE — PROGRESS NOTES
" Blaise Boss is a 84 y.o. here for a diabetic follow up exam.     Pt states they are doing well. Following a low carbohydrate diet and is active.     Up to date with eye and foot exams, pcp visits.     Active  Active with neuro dr sheth now and infusions for his MG  Mri of brain and face per above pending  Reviewed sugar and b/p logs    Middy bs 114  Aic today is 6.1 (6.2, 6.2, 6.0)  He will take an additional tablet ONLY if b/p high if bpressure >140 per dr le.   Will try to drink more water.   dr sung for back pain, injections in past, helpful.   he needs a new meter   F/u 3mo       Lab Results   Component Value Date    POCGLU 114 (A) 06/18/2025    POCGLU 134 (A) 03/18/2025    POCGLU 135 (A) 12/17/2024    POCGLU 128 (A) 09/13/2024    POCGLU 122 (A) 08/06/2024    POCGLU 98 07/16/2024    POCGLU 104 (A) 04/10/2024    POCGLU 119 (A) 12/20/2023    HGBA1C 6.1 06/18/2025    HGBA1C 6.2 03/18/2025    HGBA1C 6.2 12/17/2024    HGBA1C 6.0 09/13/2024    HGBA1C 6.0 08/06/2024    HGBA1C 6.1 07/16/2024    HGBA1C 5.5 04/10/2024    HGBA1C 5.9 (H) 03/17/2024     /80   Pulse 50   Resp 16   Ht 1.778 m (5' 10\")   Wt 82.1 kg (181 lb)   BMI 25.97 kg/m²    Wt Readings from Last 5 Encounters:   06/18/25 82.1 kg (181 lb)   03/18/25 82.6 kg (182 lb)   01/15/25 83 kg (183 lb)   12/17/24 83.5 kg (184 lb)   09/13/24 80.7 kg (178 lb)          Lab Results   Component Value Date    CREATU 249.0 08/25/2022    MICROALBCREA 29.0 08/25/2022        Review of Systems   Constitutional: Negative.    HENT: Negative.     Respiratory: Negative.     Cardiovascular: Negative.    Gastrointestinal: Negative.    Endocrine: Negative.    Genitourinary: Negative.    Musculoskeletal: Negative.    Skin: Negative.    Neurological: Negative.    Psychiatric/Behavioral: Negative.          Physical Exam  Vitals and nursing note reviewed.   Constitutional:       Appearance: Normal appearance.   HENT:      Head: Normocephalic.   Eyes:      Pupils: Pupils " are equal, round, and reactive to light.   Cardiovascular:      Rate and Rhythm: Normal rate and regular rhythm.      Heart sounds: Normal heart sounds.   Pulmonary:      Effort: Pulmonary effort is normal.      Breath sounds: Normal breath sounds.   Skin:     General: Skin is warm and dry.   Neurological:      General: No focal deficit present.      Mental Status: He is alert and oriented to person, place, and time. Mental status is at baseline.   Psychiatric:         Mood and Affect: Mood normal.         Behavior: Behavior normal.         Thought Content: Thought content normal.         Judgment: Judgment normal.          Assessment & Plan  Type 2 diabetes mellitus with hyperglycemia, without long-term current use of insulin    Orders:    POCT glycosylated hemoglobin (Hb A1C) manually resulted    POCT fingerstick glucose manually resulted    Hyperglycemia    Orders:    blood sugar diagnostic (Blood Glucose Test); Use one strip to test blood sugar bid    lancets misc; Use to check blood sugars bid    Blood glucose monitoring meter; Use to check sugar as instructed - (FORMULARY ACCEPTABLE)    MG (myasthenia gravis) (Multi)         Lumbar radiculopathy              Laura L Seaver, APRN-CNP     I spent a total of documented minutes on the date of service which included preparing to see the patient, face-to-face patient care, completing clinical documentation. Obtained and/or reviewed separately obtained history, counseling and education the patient/family/caregiver, and ordering medications, refills, tests, procedure or consults to specialists.         Answers submitted by the patient for this visit:  Diabetes Questionnaire (Submitted on 6/11/2025)  Chief Complaint: Diabetes problem  MedicAlert ID: No  CAD risks: no known risk factors  Current treatments: diet  Home blood tests: 1-2 x per day  breakfast time: after 10 am  breakfast glucose level: 110-130

## 2025-06-18 NOTE — PROGRESS NOTES
Answers submitted by the patient for this visit:  Diabetes Questionnaire (Submitted on 6/11/2025)  Chief Complaint: Diabetes problem  MedicAlert ID: No  CAD risks: no known risk factors  Current treatments: diet  Home blood tests: 1-2 x per day  breakfast time: after 10 am  breakfast glucose level: 110-130

## 2025-07-16 ENCOUNTER — APPOINTMENT (OUTPATIENT)
Dept: PRIMARY CARE | Facility: CLINIC | Age: 85
End: 2025-07-16
Payer: COMMERCIAL

## 2025-07-16 VITALS
WEIGHT: 183.6 LBS | HEIGHT: 70 IN | OXYGEN SATURATION: 95 % | SYSTOLIC BLOOD PRESSURE: 130 MMHG | TEMPERATURE: 97.3 F | BODY MASS INDEX: 26.28 KG/M2 | RESPIRATION RATE: 18 BRPM | DIASTOLIC BLOOD PRESSURE: 70 MMHG | HEART RATE: 68 BPM

## 2025-07-16 DIAGNOSIS — G70.00 MYASTHENIA GRAVIS: Primary | ICD-10-CM

## 2025-07-16 DIAGNOSIS — I10 ESSENTIAL HYPERTENSION: ICD-10-CM

## 2025-07-16 DIAGNOSIS — E78.2 MIXED HYPERLIPIDEMIA: ICD-10-CM

## 2025-07-16 DIAGNOSIS — E53.8 VITAMIN B 12 DEFICIENCY: ICD-10-CM

## 2025-07-16 PROCEDURE — G2211 COMPLEX E/M VISIT ADD ON: HCPCS | Performed by: INTERNAL MEDICINE

## 2025-07-16 PROCEDURE — 99213 OFFICE O/P EST LOW 20 MIN: CPT | Performed by: INTERNAL MEDICINE

## 2025-07-16 PROCEDURE — 3075F SYST BP GE 130 - 139MM HG: CPT | Performed by: INTERNAL MEDICINE

## 2025-07-16 PROCEDURE — 3078F DIAST BP <80 MM HG: CPT | Performed by: INTERNAL MEDICINE

## 2025-07-16 PROCEDURE — 1159F MED LIST DOCD IN RCRD: CPT | Performed by: INTERNAL MEDICINE

## 2025-07-16 PROCEDURE — 1160F RVW MEDS BY RX/DR IN RCRD: CPT | Performed by: INTERNAL MEDICINE

## 2025-07-16 RX ORDER — LISINOPRIL AND HYDROCHLOROTHIAZIDE 10; 12.5 MG/1; MG/1
1 TABLET ORAL DAILY
Qty: 90 TABLET | Refills: 2 | Status: SHIPPED | OUTPATIENT
Start: 2025-07-16

## 2025-07-16 ASSESSMENT — PATIENT HEALTH QUESTIONNAIRE - PHQ9
1. LITTLE INTEREST OR PLEASURE IN DOING THINGS: NOT AT ALL
2. FEELING DOWN, DEPRESSED OR HOPELESS: NOT AT ALL
SUM OF ALL RESPONSES TO PHQ9 QUESTIONS 1 AND 2: 0

## 2025-07-16 NOTE — PROGRESS NOTES
"Subjective   Patient ID: Blaise Boss is a 84 y.o. male who presents for Follow-up (6 Months).    HPI sees neuro Dr Braga   Weight up   MG good   Reviewed  sugars  bp  hr   Overall stable with weight       NEEDS TET. VACCINE     Review of Systems    Objective   /70 (BP Location: Left arm, Patient Position: Sitting, BP Cuff Size: Adult)   Pulse 68   Temp 36.3 °C (97.3 °F) (Temporal)   Resp 18   Ht 1.778 m (5' 10\")   Wt 83.3 kg (183 lb 9.6 oz)   SpO2 95%   BMI 26.34 kg/m²     Physical Exam  NAD  CARD RRR  PULM CTA  ABD NEG   EXT  NL    Assessment/Plan   Diagnoses and all orders for this visit:  Myasthenia gravis  Comments:  good  Essential hypertension  Comments:  good  Mixed hyperlipidemia  Vitamin B 12 deficiency  Other orders  -     Follow Up In Primary Care - Established  -     Follow Up In Primary Care - Established; Future         "

## 2025-09-10 ENCOUNTER — APPOINTMENT (OUTPATIENT)
Dept: PRIMARY CARE | Facility: CLINIC | Age: 85
End: 2025-09-10
Payer: COMMERCIAL

## 2025-09-17 ENCOUNTER — APPOINTMENT (OUTPATIENT)
Dept: PRIMARY CARE | Facility: CLINIC | Age: 85
End: 2025-09-17
Payer: COMMERCIAL

## 2026-01-21 ENCOUNTER — APPOINTMENT (OUTPATIENT)
Dept: PRIMARY CARE | Facility: CLINIC | Age: 86
End: 2026-01-21
Payer: COMMERCIAL